# Patient Record
Sex: FEMALE | NOT HISPANIC OR LATINO | Employment: PART TIME | URBAN - METROPOLITAN AREA
[De-identification: names, ages, dates, MRNs, and addresses within clinical notes are randomized per-mention and may not be internally consistent; named-entity substitution may affect disease eponyms.]

---

## 2023-11-13 DIAGNOSIS — E11.9 TYPE 2 DIABETES MELLITUS WITHOUT COMPLICATION, WITHOUT LONG-TERM CURRENT USE OF INSULIN (MULTI): Primary | ICD-10-CM

## 2023-11-13 RX ORDER — METFORMIN HYDROCHLORIDE 500 MG/1
1000 TABLET, EXTENDED RELEASE ORAL 2 TIMES DAILY
Qty: 120 TABLET | Refills: 0 | Status: SHIPPED | OUTPATIENT
Start: 2023-11-13

## 2024-01-18 DIAGNOSIS — N18.31 CHRONIC KIDNEY DISEASE, STAGE 3A (MULTI): Primary | ICD-10-CM

## 2024-01-19 ENCOUNTER — LAB (OUTPATIENT)
Dept: LAB | Facility: LAB | Age: 69
End: 2024-01-19
Payer: COMMERCIAL

## 2024-01-19 DIAGNOSIS — N18.31 CHRONIC KIDNEY DISEASE, STAGE 3A (MULTI): ICD-10-CM

## 2024-01-19 LAB
ALBUMIN SERPL-MCNC: 4.3 G/DL (ref 3.5–5)
ANION GAP SERPL CALC-SCNC: 9 MMOL/L
BUN SERPL-MCNC: 21 MG/DL (ref 8–25)
CALCIUM SERPL-MCNC: 9.9 MG/DL (ref 8.5–10.4)
CHLORIDE SERPL-SCNC: 104 MMOL/L (ref 97–107)
CO2 SERPL-SCNC: 30 MMOL/L (ref 24–31)
CREAT SERPL-MCNC: 1.2 MG/DL (ref 0.4–1.6)
EGFRCR SERPLBLD CKD-EPI 2021: 50 ML/MIN/1.73M*2
ERYTHROCYTE [DISTWIDTH] IN BLOOD BY AUTOMATED COUNT: 15.4 % (ref 11.5–14.5)
GLUCOSE SERPL-MCNC: 129 MG/DL (ref 65–99)
HCT VFR BLD AUTO: 35 % (ref 36–46)
HGB BLD-MCNC: 11.2 G/DL (ref 12–16)
MCH RBC QN AUTO: 28.4 PG (ref 26–34)
MCHC RBC AUTO-ENTMCNC: 32 G/DL (ref 32–36)
MCV RBC AUTO: 89 FL (ref 80–100)
NRBC BLD-RTO: 0 /100 WBCS (ref 0–0)
PHOSPHATE SERPL-MCNC: 4.1 MG/DL (ref 2.5–4.5)
PLATELET # BLD AUTO: 253 X10*3/UL (ref 150–450)
POTASSIUM SERPL-SCNC: 4.6 MMOL/L (ref 3.4–5.1)
RBC # BLD AUTO: 3.95 X10*6/UL (ref 4–5.2)
SODIUM SERPL-SCNC: 143 MMOL/L (ref 133–145)
WBC # BLD AUTO: 5.9 X10*3/UL (ref 4.4–11.3)

## 2024-01-19 PROCEDURE — 85027 COMPLETE CBC AUTOMATED: CPT

## 2024-01-19 PROCEDURE — 80069 RENAL FUNCTION PANEL: CPT

## 2024-01-19 PROCEDURE — 36415 COLL VENOUS BLD VENIPUNCTURE: CPT

## 2024-01-25 DIAGNOSIS — N18.31 CHRONIC KIDNEY DISEASE, STAGE 3A (MULTI): Primary | ICD-10-CM

## 2024-03-22 ENCOUNTER — HOSPITAL ENCOUNTER (OUTPATIENT)
Dept: RADIOLOGY | Facility: HOSPITAL | Age: 69
Discharge: HOME | End: 2024-03-22
Payer: COMMERCIAL

## 2024-03-22 VITALS — HEIGHT: 65 IN | BODY MASS INDEX: 36.65 KG/M2 | WEIGHT: 220 LBS

## 2024-03-22 DIAGNOSIS — Z12.39 ENCOUNTER FOR OTHER SCREENING FOR MALIGNANT NEOPLASM OF BREAST: ICD-10-CM

## 2024-03-22 DIAGNOSIS — Z13.6 ENCOUNTER FOR SCREENING FOR CARDIOVASCULAR DISORDERS: ICD-10-CM

## 2024-03-22 PROCEDURE — 77067 SCR MAMMO BI INCL CAD: CPT

## 2024-03-22 PROCEDURE — 76706 US ABDL AORTA SCREEN AAA: CPT

## 2024-03-22 PROCEDURE — 77063 BREAST TOMOSYNTHESIS BI: CPT | Performed by: RADIOLOGY

## 2024-03-22 PROCEDURE — 77067 SCR MAMMO BI INCL CAD: CPT | Performed by: RADIOLOGY

## 2024-03-22 PROCEDURE — 76706 US ABDL AORTA SCREEN AAA: CPT | Performed by: RADIOLOGY

## 2024-04-26 ENCOUNTER — HOSPITAL ENCOUNTER (OUTPATIENT)
Dept: RADIOLOGY | Facility: HOSPITAL | Age: 69
Discharge: HOME | End: 2024-04-26
Payer: COMMERCIAL

## 2024-04-26 DIAGNOSIS — Z78.0 ASYMPTOMATIC MENOPAUSAL STATE: ICD-10-CM

## 2024-04-26 DIAGNOSIS — Z13.6 ENCOUNTER FOR SCREENING FOR CARDIOVASCULAR DISORDERS: ICD-10-CM

## 2024-04-26 PROCEDURE — 77080 DXA BONE DENSITY AXIAL: CPT | Performed by: RADIOLOGY

## 2024-04-26 PROCEDURE — 75571 CT HRT W/O DYE W/CA TEST: CPT

## 2024-04-26 PROCEDURE — 77080 DXA BONE DENSITY AXIAL: CPT

## 2024-05-15 ENCOUNTER — LAB (OUTPATIENT)
Dept: LAB | Facility: LAB | Age: 69
End: 2024-05-15
Payer: COMMERCIAL

## 2024-05-15 DIAGNOSIS — N18.31 CHRONIC KIDNEY DISEASE, STAGE 3A (MULTI): ICD-10-CM

## 2024-05-15 LAB
ALBUMIN SERPL-MCNC: 4.2 G/DL (ref 3.5–5)
ANION GAP SERPL CALC-SCNC: 14 MMOL/L
BUN SERPL-MCNC: 26 MG/DL (ref 8–25)
CALCIUM SERPL-MCNC: 9.9 MG/DL (ref 8.5–10.4)
CHLORIDE SERPL-SCNC: 102 MMOL/L (ref 97–107)
CO2 SERPL-SCNC: 25 MMOL/L (ref 24–31)
CREAT SERPL-MCNC: 1.2 MG/DL (ref 0.4–1.6)
EGFRCR SERPLBLD CKD-EPI 2021: 50 ML/MIN/1.73M*2
ERYTHROCYTE [DISTWIDTH] IN BLOOD BY AUTOMATED COUNT: 16.3 % (ref 11.5–14.5)
GLUCOSE SERPL-MCNC: 69 MG/DL (ref 65–99)
HCT VFR BLD AUTO: 34 % (ref 36–46)
HGB BLD-MCNC: 10.8 G/DL (ref 12–16)
MCH RBC QN AUTO: 27.8 PG (ref 26–34)
MCHC RBC AUTO-ENTMCNC: 31.8 G/DL (ref 32–36)
MCV RBC AUTO: 88 FL (ref 80–100)
NRBC BLD-RTO: 0 /100 WBCS (ref 0–0)
PHOSPHATE SERPL-MCNC: 3.6 MG/DL (ref 2.5–4.5)
PLATELET # BLD AUTO: 257 X10*3/UL (ref 150–450)
POTASSIUM SERPL-SCNC: 4.9 MMOL/L (ref 3.4–5.1)
RBC # BLD AUTO: 3.88 X10*6/UL (ref 4–5.2)
SODIUM SERPL-SCNC: 141 MMOL/L (ref 133–145)
WBC # BLD AUTO: 6.2 X10*3/UL (ref 4.4–11.3)

## 2024-05-15 PROCEDURE — 36415 COLL VENOUS BLD VENIPUNCTURE: CPT

## 2024-05-15 PROCEDURE — 80069 RENAL FUNCTION PANEL: CPT

## 2024-05-15 PROCEDURE — 85027 COMPLETE CBC AUTOMATED: CPT

## 2024-09-27 DIAGNOSIS — I82.562 CHRONIC DEEP VEIN THROMBOSIS (DVT) OF CALF MUSCLE VEIN OF LEFT LOWER EXTREMITY (MULTI): Primary | ICD-10-CM

## 2024-10-07 ENCOUNTER — HOSPITAL ENCOUNTER (OUTPATIENT)
Dept: RADIOLOGY | Facility: CLINIC | Age: 69
Discharge: HOME | End: 2024-10-07
Payer: COMMERCIAL

## 2024-10-07 ENCOUNTER — HOSPITAL ENCOUNTER (OUTPATIENT)
Dept: RADIOLOGY | Facility: CLINIC | Age: 69
End: 2024-10-07
Payer: COMMERCIAL

## 2024-10-07 DIAGNOSIS — I82.562 CHRONIC DEEP VEIN THROMBOSIS (DVT) OF CALF MUSCLE VEIN OF LEFT LOWER EXTREMITY (MULTI): ICD-10-CM

## 2024-10-07 PROCEDURE — 93971 EXTREMITY STUDY: CPT | Performed by: RADIOLOGY

## 2024-10-07 PROCEDURE — 93971 EXTREMITY STUDY: CPT

## 2025-02-19 ENCOUNTER — APPOINTMENT (OUTPATIENT)
Dept: PRIMARY CARE | Facility: CLINIC | Age: 70
End: 2025-02-19
Payer: COMMERCIAL

## 2025-02-19 ENCOUNTER — TELEPHONE (OUTPATIENT)
Dept: PRIMARY CARE | Facility: CLINIC | Age: 70
End: 2025-02-19

## 2025-02-19 VITALS
WEIGHT: 226 LBS | HEART RATE: 70 BPM | HEIGHT: 66 IN | RESPIRATION RATE: 18 BRPM | SYSTOLIC BLOOD PRESSURE: 102 MMHG | DIASTOLIC BLOOD PRESSURE: 56 MMHG | BODY MASS INDEX: 36.32 KG/M2 | OXYGEN SATURATION: 99 %

## 2025-02-19 DIAGNOSIS — E11.22 TYPE 2 DIABETES MELLITUS WITH STAGE 3A CHRONIC KIDNEY DISEASE, WITHOUT LONG-TERM CURRENT USE OF INSULIN (MULTI): ICD-10-CM

## 2025-02-19 DIAGNOSIS — Z79.899 MEDICATION MANAGEMENT: ICD-10-CM

## 2025-02-19 DIAGNOSIS — N18.31 TYPE 2 DIABETES MELLITUS WITH STAGE 3A CHRONIC KIDNEY DISEASE, WITHOUT LONG-TERM CURRENT USE OF INSULIN (MULTI): Primary | ICD-10-CM

## 2025-02-19 DIAGNOSIS — E03.9 HYPOTHYROIDISM, UNSPECIFIED TYPE: ICD-10-CM

## 2025-02-19 DIAGNOSIS — J01.40 ACUTE PANSINUSITIS, RECURRENCE NOT SPECIFIED: ICD-10-CM

## 2025-02-19 DIAGNOSIS — I10 PRIMARY HYPERTENSION: ICD-10-CM

## 2025-02-19 DIAGNOSIS — E11.22 TYPE 2 DIABETES MELLITUS WITH STAGE 3A CHRONIC KIDNEY DISEASE, WITHOUT LONG-TERM CURRENT USE OF INSULIN (MULTI): Primary | ICD-10-CM

## 2025-02-19 DIAGNOSIS — N18.31 TYPE 2 DIABETES MELLITUS WITH STAGE 3A CHRONIC KIDNEY DISEASE, WITHOUT LONG-TERM CURRENT USE OF INSULIN (MULTI): ICD-10-CM

## 2025-02-19 DIAGNOSIS — K58.0 IRRITABLE BOWEL SYNDROME WITH DIARRHEA: ICD-10-CM

## 2025-02-19 LAB — POC HEMOGLOBIN A1C: 7 % (ref 4.2–6.5)

## 2025-02-19 PROCEDURE — 3008F BODY MASS INDEX DOCD: CPT

## 2025-02-19 PROCEDURE — G2211 COMPLEX E/M VISIT ADD ON: HCPCS

## 2025-02-19 PROCEDURE — 1159F MED LIST DOCD IN RCRD: CPT

## 2025-02-19 PROCEDURE — 3074F SYST BP LT 130 MM HG: CPT

## 2025-02-19 PROCEDURE — 83036 HEMOGLOBIN GLYCOSYLATED A1C: CPT

## 2025-02-19 PROCEDURE — 1126F AMNT PAIN NOTED NONE PRSNT: CPT

## 2025-02-19 PROCEDURE — 4010F ACE/ARB THERAPY RXD/TAKEN: CPT

## 2025-02-19 PROCEDURE — 1160F RVW MEDS BY RX/DR IN RCRD: CPT

## 2025-02-19 PROCEDURE — 99204 OFFICE O/P NEW MOD 45 MIN: CPT

## 2025-02-19 PROCEDURE — 3078F DIAST BP <80 MM HG: CPT

## 2025-02-19 RX ORDER — FLUTICASONE PROPIONATE 50 MCG
1 SPRAY, SUSPENSION (ML) NASAL DAILY
Qty: 16 G | Refills: 0 | Status: SHIPPED | OUTPATIENT
Start: 2025-02-19

## 2025-02-19 RX ORDER — FERROUS SULFATE 325(65) MG
1 TABLET ORAL 2 TIMES DAILY
COMMUNITY
Start: 2021-08-25 | End: 2025-06-21

## 2025-02-19 RX ORDER — INSULIN GLARGINE 100 [IU]/ML
10 INJECTION, SOLUTION SUBCUTANEOUS NIGHTLY
Start: 2025-02-19

## 2025-02-19 RX ORDER — PEN NEEDLE, DIABETIC 31 GX5/16"
NEEDLE, DISPOSABLE MISCELLANEOUS
COMMUNITY
Start: 2024-07-19

## 2025-02-19 RX ORDER — LEVOTHYROXINE SODIUM 75 UG/1
75 TABLET ORAL
COMMUNITY
Start: 2023-03-31 | End: 2026-02-14

## 2025-02-19 RX ORDER — FUROSEMIDE 40 MG/1
20 TABLET ORAL EVERY OTHER DAY
COMMUNITY
Start: 2023-06-26

## 2025-02-19 RX ORDER — CHOLECALCIFEROL (VITAMIN D3) 25 MCG
1 TABLET ORAL DAILY
COMMUNITY
Start: 2017-01-23

## 2025-02-19 RX ORDER — INSULIN GLARGINE 100 [IU]/ML
INJECTION, SOLUTION SUBCUTANEOUS
COMMUNITY
Start: 2022-04-18 | End: 2025-02-19 | Stop reason: WASHOUT

## 2025-02-19 RX ORDER — PETROLATUM,WHITE 41 %
OINTMENT (GRAM) TOPICAL
COMMUNITY
Start: 2023-07-21

## 2025-02-19 RX ORDER — CLOBETASOL PROPIONATE 0.5 MG/G
CREAM TOPICAL 2 TIMES DAILY
COMMUNITY
Start: 2023-07-21

## 2025-02-19 RX ORDER — OXYBUTYNIN CHLORIDE 10 MG/1
10 TABLET, EXTENDED RELEASE ORAL DAILY
COMMUNITY

## 2025-02-19 RX ORDER — ALBUTEROL SULFATE 90 UG/1
INHALANT RESPIRATORY (INHALATION)
COMMUNITY
Start: 2017-06-05

## 2025-02-19 RX ORDER — MONTELUKAST SODIUM 10 MG/1
1 TABLET ORAL DAILY
COMMUNITY
Start: 2017-10-31

## 2025-02-19 RX ORDER — FAMOTIDINE 40 MG/1
1 TABLET, FILM COATED ORAL NIGHTLY
COMMUNITY
Start: 2023-10-13 | End: 2026-02-14

## 2025-02-19 RX ORDER — LISINOPRIL 2.5 MG/1
2.5 TABLET ORAL
COMMUNITY
Start: 2024-12-06

## 2025-02-19 RX ORDER — CICLOPIROX 80 MG/ML
SOLUTION TOPICAL
COMMUNITY
Start: 2024-07-19

## 2025-02-19 RX ORDER — AMOXICILLIN AND CLAVULANATE POTASSIUM 875; 125 MG/1; MG/1
875 TABLET, FILM COATED ORAL 2 TIMES DAILY
Qty: 20 TABLET | Refills: 0 | Status: SHIPPED | OUTPATIENT
Start: 2025-02-19 | End: 2025-03-01

## 2025-02-19 ASSESSMENT — PATIENT HEALTH QUESTIONNAIRE - PHQ9
SUM OF ALL RESPONSES TO PHQ9 QUESTIONS 1 AND 2: 6
7. TROUBLE CONCENTRATING ON THINGS, SUCH AS READING THE NEWSPAPER OR WATCHING TELEVISION: MORE THAN HALF THE DAYS
6. FEELING BAD ABOUT YOURSELF - OR THAT YOU ARE A FAILURE OR HAVE LET YOURSELF OR YOUR FAMILY DOWN: NEARLY EVERY DAY
3. TROUBLE FALLING OR STAYING ASLEEP OR SLEEPING TOO MUCH: MORE THAN HALF THE DAYS
2. FEELING DOWN, DEPRESSED OR HOPELESS: NEARLY EVERY DAY
4. FEELING TIRED OR HAVING LITTLE ENERGY: NOT AT ALL
5. POOR APPETITE OR OVEREATING: SEVERAL DAYS
8. MOVING OR SPEAKING SO SLOWLY THAT OTHER PEOPLE COULD HAVE NOTICED. OR THE OPPOSITE, BEING SO FIGETY OR RESTLESS THAT YOU HAVE BEEN MOVING AROUND A LOT MORE THAN USUAL: NOT AT ALL
10. IF YOU CHECKED OFF ANY PROBLEMS, HOW DIFFICULT HAVE THESE PROBLEMS MADE IT FOR YOU TO DO YOUR WORK, TAKE CARE OF THINGS AT HOME, OR GET ALONG WITH OTHER PEOPLE: NOT DIFFICULT AT ALL
9. THOUGHTS THAT YOU WOULD BE BETTER OFF DEAD, OR OF HURTING YOURSELF: NOT AT ALL
1. LITTLE INTEREST OR PLEASURE IN DOING THINGS: NEARLY EVERY DAY
SUM OF ALL RESPONSES TO PHQ QUESTIONS 1-9: 14

## 2025-02-19 ASSESSMENT — ENCOUNTER SYMPTOMS
CHILLS: 1
RHINORRHEA: 0
FEVER: 0
SORE THROAT: 0
SHORTNESS OF BREATH: 0
APPETITE CHANGE: 1
COUGH: 1
SINUS PRESSURE: 1
HEADACHES: 1

## 2025-02-19 ASSESSMENT — PAIN SCALES - GENERAL: PAINLEVEL_OUTOF10: 0-NO PAIN

## 2025-02-19 NOTE — PROGRESS NOTES
"Subjective   Patient ID: Kayli Senior is a 69 y.o. female who presents for Diabetes (New patient is here to discuss diabetes, IBS and bronchitis ).    HPI   Kayli is a new patient here to establish care. PMH, FH, SH, medications reviewed and updated with patient.     DM: On Januvia 100mg, Jardiance 25mg, Metformin XR 1,000mg BID, Lantus 10U nightly. Last A1C 7.0. Does not check sugars at home. Last microalbumin - <0.05 and GFR - 47 (10/24). On ACEI. Last eye exam - sees Dr. Stafford and Paola. Last foot exam - sees podiatry monthly (Dr. Madison). Denies hypoglycemic incidents, chest pain, shortness of breath, headache.     Hypothyroidism: On Levothyroxine 75 mcg daily. Most recent TSH 03/22.     HTN: On Lisinopril 2.5 mg, Lasix 40mg every other day. No medication side effects. Does not check home BP. Gets dizziness when standing. Denies chest pain, heart palpitations, SOB, headaches, changes of vision, syncope, leg swelling.     Chronic IBS and fecal incontinence     Review of Systems   Constitutional:  Positive for appetite change and chills. Negative for fever.   HENT:  Positive for congestion and sinus pressure. Negative for postnasal drip, rhinorrhea and sore throat.    Respiratory:  Positive for cough. Negative for shortness of breath.    Cardiovascular:  Negative for chest pain.   Neurological:  Positive for headaches.     Objective   /56 (BP Location: Left arm, Patient Position: Sitting, BP Cuff Size: Large adult)   Pulse 70   Resp 18   Ht 1.664 m (5' 5.5\")   Wt 103 kg (226 lb)   LMP  (LMP Unknown)   SpO2 99%   BMI 37.04 kg/m²     Physical Exam  Vitals and nursing note reviewed.   Constitutional:       General: She is not in acute distress.  HENT:      Right Ear: Tympanic membrane and ear canal normal.      Left Ear: Tympanic membrane and ear canal normal.      Nose: Congestion present.      Mouth/Throat:      Mouth: Mucous membranes are moist.      Pharynx: No oropharyngeal exudate.   Eyes: "      Extraocular Movements: Extraocular movements intact.      Conjunctiva/sclera: Conjunctivae normal.   Neck:      Vascular: No carotid bruit.   Cardiovascular:      Rate and Rhythm: Normal rate and regular rhythm.   Pulmonary:      Effort: Pulmonary effort is normal.      Breath sounds: Normal breath sounds.   Musculoskeletal:      Cervical back: Neck supple.      Right lower leg: No edema.      Left lower leg: No edema.   Lymphadenopathy:      Cervical: No cervical adenopathy.   Neurological:      General: No focal deficit present.      Mental Status: She is alert.   Psychiatric:         Mood and Affect: Mood normal.         Assessment/Plan   Assessment & Plan  Type 2 diabetes mellitus with stage 3a chronic kidney disease, without long-term current use of insulin (Multi)  Chronic. Continue Januvia 100mg, Jardiance 25mg, Metformin XR 1,000mg BID, Lantus 10U nightly. Low carbohydrate diet and increase in activity. Labs ordered, will follow up with results. Recheck in 2 weeks with Bina Lafleur Rph. Consider CGM at this time.     Orders:    POCT glycosylated hemoglobin (Hb A1C) manually resulted    Collection capillary blood specimen; Future    Comprehensive metabolic panel; Future    CBC and Auto Differential; Future    Albumin-Creatinine Ratio, Urine Random; Future    Lipid Panel; Future    insulin glargine (Lantus U-100 Insulin) 100 unit/mL injection; Inject 10 Units under the skin once daily at bedtime.    Primary hypertension  Chronic. Continue Lisinopril 2.5 mg. Decrease Lasix to 20mg every other day. DASH diet and 30 minutes of exercise 5x/week. Check home BP and keep log. Labs ordered, will follow up with results. Recheck in 1 month at CPE.     Orders:    Comprehensive metabolic panel; Future    CBC and Auto Differential; Future    Albumin-Creatinine Ratio, Urine Random; Future    Hypothyroidism, unspecified type  Chronic. Continue Levothyroxine 75mcg. Labs ordered, will follow up with results.    Orders:     Tsh With Reflex To Free T4 If Abnormal; Future    Irritable bowel syndrome with diarrhea  Chronic, needs better control.   FODMAP diet, food journal.   Referral to GI for further evaluation and management.     Orders:    Referral to Gastroenterology; Future    Acute pansinusitis, recurrence not specified  Acute.   Augmentin as directed. Risks and benefits of medication discussed and prescribed.   OTC Tylenol as directed for sinus pain. Flonase as directed for sinus congestion and ear pressure. OTC antihistamine as directed for sinus drainage. Increase fluids, rest, humidifier.   Follow up if symptoms do not improve within 7-10 days, or sooner for worsening.     Orders:    amoxicillin-pot clavulanate (Augmentin) 875-125 mg tablet; Take 1 tablet (875 mg) by mouth 2 times a day for 10 days. Take with food.    fluticasone (Flonase) 50 mcg/actuation nasal spray; Administer 1 spray into each nostril once daily. Shake gently. Before first use, prime pump. After use, clean tip and replace cap.

## 2025-02-27 LAB
ALBUMIN SERPL-MCNC: 4 G/DL (ref 3.6–5.1)
ALBUMIN/CREAT UR: NORMAL MG/G CREAT
ALP SERPL-CCNC: 93 U/L (ref 37–153)
ALT SERPL-CCNC: 8 U/L (ref 6–29)
ANION GAP SERPL CALCULATED.4IONS-SCNC: 8 MMOL/L (CALC) (ref 7–17)
AST SERPL-CCNC: 11 U/L (ref 10–35)
BASOPHILS # BLD AUTO: 31 CELLS/UL (ref 0–200)
BASOPHILS NFR BLD AUTO: 0.5 %
BILIRUB SERPL-MCNC: 0.5 MG/DL (ref 0.2–1.2)
BUN SERPL-MCNC: 22 MG/DL (ref 7–25)
CALCIUM SERPL-MCNC: 9.3 MG/DL (ref 8.6–10.4)
CHLORIDE SERPL-SCNC: 104 MMOL/L (ref 98–110)
CHOLEST SERPL-MCNC: 160 MG/DL
CHOLEST/HDLC SERPL: 2.9 (CALC)
CO2 SERPL-SCNC: 30 MMOL/L (ref 20–32)
CREAT SERPL-MCNC: 1.17 MG/DL (ref 0.5–1.05)
CREAT UR-MCNC: 60 MG/DL (ref 20–275)
EGFRCR SERPLBLD CKD-EPI 2021: 51 ML/MIN/1.73M2
EOSINOPHIL # BLD AUTO: 372 CELLS/UL (ref 15–500)
EOSINOPHIL NFR BLD AUTO: 6.1 %
ERYTHROCYTE [DISTWIDTH] IN BLOOD BY AUTOMATED COUNT: 15.3 % (ref 11–15)
GLUCOSE SERPL-MCNC: 95 MG/DL (ref 65–99)
HCT VFR BLD AUTO: 33 % (ref 35–45)
HDLC SERPL-MCNC: 56 MG/DL
HGB BLD-MCNC: 10.6 G/DL (ref 11.7–15.5)
LDLC SERPL CALC-MCNC: 90 MG/DL (CALC)
LYMPHOCYTES # BLD AUTO: 1092 CELLS/UL (ref 850–3900)
LYMPHOCYTES NFR BLD AUTO: 17.9 %
MCH RBC QN AUTO: 28.9 PG (ref 27–33)
MCHC RBC AUTO-ENTMCNC: 32.1 G/DL (ref 32–36)
MCV RBC AUTO: 89.9 FL (ref 80–100)
MICROALBUMIN UR-MCNC: <0.2 MG/DL
MONOCYTES # BLD AUTO: 561 CELLS/UL (ref 200–950)
MONOCYTES NFR BLD AUTO: 9.2 %
NEUTROPHILS # BLD AUTO: 4044 CELLS/UL (ref 1500–7800)
NEUTROPHILS NFR BLD AUTO: 66.3 %
NONHDLC SERPL-MCNC: 104 MG/DL (CALC)
PLATELET # BLD AUTO: 307 THOUSAND/UL (ref 140–400)
PMV BLD REES-ECKER: 10.3 FL (ref 7.5–12.5)
POTASSIUM SERPL-SCNC: 4.4 MMOL/L (ref 3.5–5.3)
PROT SERPL-MCNC: 6.5 G/DL (ref 6.1–8.1)
RBC # BLD AUTO: 3.67 MILLION/UL (ref 3.8–5.1)
SODIUM SERPL-SCNC: 142 MMOL/L (ref 135–146)
TRIGL SERPL-MCNC: 54 MG/DL
TSH SERPL-ACNC: 0.73 MIU/L (ref 0.4–4.5)
WBC # BLD AUTO: 6.1 THOUSAND/UL (ref 3.8–10.8)

## 2025-03-12 ENCOUNTER — TELEPHONE (OUTPATIENT)
Dept: PRIMARY CARE | Facility: CLINIC | Age: 70
End: 2025-03-12

## 2025-03-12 ENCOUNTER — APPOINTMENT (OUTPATIENT)
Dept: PRIMARY CARE | Facility: CLINIC | Age: 70
End: 2025-03-12
Payer: COMMERCIAL

## 2025-03-12 VITALS
SYSTOLIC BLOOD PRESSURE: 110 MMHG | HEART RATE: 59 BPM | TEMPERATURE: 97.2 F | BODY MASS INDEX: 39.36 KG/M2 | DIASTOLIC BLOOD PRESSURE: 60 MMHG | WEIGHT: 240.2 LBS | OXYGEN SATURATION: 98 %

## 2025-03-12 DIAGNOSIS — J45.20 MILD INTERMITTENT ASTHMA WITHOUT COMPLICATION (HHS-HCC): ICD-10-CM

## 2025-03-12 DIAGNOSIS — E03.9 HYPOTHYROIDISM, UNSPECIFIED TYPE: ICD-10-CM

## 2025-03-12 DIAGNOSIS — E11.22 TYPE 2 DIABETES MELLITUS WITH STAGE 3A CHRONIC KIDNEY DISEASE, WITHOUT LONG-TERM CURRENT USE OF INSULIN (MULTI): ICD-10-CM

## 2025-03-12 DIAGNOSIS — Z00.00 ROUTINE GENERAL MEDICAL EXAMINATION AT A HEALTH CARE FACILITY: Primary | ICD-10-CM

## 2025-03-12 DIAGNOSIS — Z12.31 ENCOUNTER FOR SCREENING MAMMOGRAM FOR BREAST CANCER: ICD-10-CM

## 2025-03-12 DIAGNOSIS — E11.22 TYPE 2 DIABETES MELLITUS WITH STAGE 3A CHRONIC KIDNEY DISEASE, WITHOUT LONG-TERM CURRENT USE OF INSULIN (MULTI): Primary | ICD-10-CM

## 2025-03-12 DIAGNOSIS — K21.9 GASTROESOPHAGEAL REFLUX DISEASE, UNSPECIFIED WHETHER ESOPHAGITIS PRESENT: ICD-10-CM

## 2025-03-12 DIAGNOSIS — D50.9 IRON DEFICIENCY ANEMIA, UNSPECIFIED IRON DEFICIENCY ANEMIA TYPE: ICD-10-CM

## 2025-03-12 DIAGNOSIS — M25.542 JOINT PAIN IN BOTH HANDS: ICD-10-CM

## 2025-03-12 DIAGNOSIS — R06.09 DOE (DYSPNEA ON EXERTION): ICD-10-CM

## 2025-03-12 DIAGNOSIS — N18.31 TYPE 2 DIABETES MELLITUS WITH STAGE 3A CHRONIC KIDNEY DISEASE, WITHOUT LONG-TERM CURRENT USE OF INSULIN (MULTI): ICD-10-CM

## 2025-03-12 DIAGNOSIS — I10 PRIMARY HYPERTENSION: ICD-10-CM

## 2025-03-12 DIAGNOSIS — M25.541 JOINT PAIN IN BOTH HANDS: ICD-10-CM

## 2025-03-12 DIAGNOSIS — N18.31 TYPE 2 DIABETES MELLITUS WITH STAGE 3A CHRONIC KIDNEY DISEASE, WITHOUT LONG-TERM CURRENT USE OF INSULIN (MULTI): Primary | ICD-10-CM

## 2025-03-12 PROCEDURE — 4010F ACE/ARB THERAPY RXD/TAKEN: CPT

## 2025-03-12 PROCEDURE — 3074F SYST BP LT 130 MM HG: CPT

## 2025-03-12 PROCEDURE — 1126F AMNT PAIN NOTED NONE PRSNT: CPT

## 2025-03-12 PROCEDURE — 99214 OFFICE O/P EST MOD 30 MIN: CPT

## 2025-03-12 PROCEDURE — 1170F FXNL STATUS ASSESSED: CPT

## 2025-03-12 PROCEDURE — 3078F DIAST BP <80 MM HG: CPT

## 2025-03-12 PROCEDURE — G0439 PPPS, SUBSEQ VISIT: HCPCS

## 2025-03-12 PROCEDURE — 1159F MED LIST DOCD IN RCRD: CPT

## 2025-03-12 PROCEDURE — 1160F RVW MEDS BY RX/DR IN RCRD: CPT

## 2025-03-12 PROCEDURE — 99397 PER PM REEVAL EST PAT 65+ YR: CPT

## 2025-03-12 PROCEDURE — 1036F TOBACCO NON-USER: CPT

## 2025-03-12 RX ORDER — GLUCAGON INJECTION, SOLUTION 1 MG/.2ML
INJECTION, SOLUTION SUBCUTANEOUS
Qty: 0.4 ML | Refills: 11 | Status: SHIPPED | OUTPATIENT
Start: 2025-03-12

## 2025-03-12 RX ORDER — OMEPRAZOLE 20 MG/1
20 CAPSULE, DELAYED RELEASE ORAL DAILY
Qty: 90 CAPSULE | Refills: 0 | Status: SHIPPED | OUTPATIENT
Start: 2025-03-12 | End: 2025-06-10

## 2025-03-12 RX ORDER — TIRZEPATIDE 2.5 MG/.5ML
2.5 INJECTION, SOLUTION SUBCUTANEOUS
Qty: 2 ML | Refills: 0 | Status: SHIPPED | OUTPATIENT
Start: 2025-03-12 | End: 2025-04-09

## 2025-03-12 RX ORDER — ALBUTEROL SULFATE 90 UG/1
2 INHALANT RESPIRATORY (INHALATION) EVERY 4 HOURS PRN
Qty: 18 G | Refills: 0 | Status: SHIPPED | OUTPATIENT
Start: 2025-03-12

## 2025-03-12 ASSESSMENT — ENCOUNTER SYMPTOMS
NAUSEA: 0
SORE THROAT: 0
ABDOMINAL PAIN: 0
OCCASIONAL FEELINGS OF UNSTEADINESS: 0
HEADACHES: 0
RHINORRHEA: 0
WEAKNESS: 0
FEVER: 0
VOMITING: 0
UNEXPECTED WEIGHT CHANGE: 0
DYSPHORIC MOOD: 0
NERVOUS/ANXIOUS: 0
COUGH: 0
ARTHRALGIAS: 0
LOSS OF SENSATION IN FEET: 0
MYALGIAS: 0
CHILLS: 0
BLOOD IN STOOL: 0
TROUBLE SWALLOWING: 0
NUMBNESS: 0
DEPRESSION: 0
DYSURIA: 0
SHORTNESS OF BREATH: 0
HEMATURIA: 0

## 2025-03-12 ASSESSMENT — PAIN SCALES - GENERAL: PAINLEVEL_OUTOF10: 0-NO PAIN

## 2025-03-12 ASSESSMENT — PATIENT HEALTH QUESTIONNAIRE - PHQ9
SUM OF ALL RESPONSES TO PHQ9 QUESTIONS 1 AND 2: 2
2. FEELING DOWN, DEPRESSED OR HOPELESS: SEVERAL DAYS
10. IF YOU CHECKED OFF ANY PROBLEMS, HOW DIFFICULT HAVE THESE PROBLEMS MADE IT FOR YOU TO DO YOUR WORK, TAKE CARE OF THINGS AT HOME, OR GET ALONG WITH OTHER PEOPLE: NOT DIFFICULT AT ALL
1. LITTLE INTEREST OR PLEASURE IN DOING THINGS: SEVERAL DAYS

## 2025-03-12 ASSESSMENT — ACTIVITIES OF DAILY LIVING (ADL)
TAKING_MEDICATION: INDEPENDENT
GROCERY_SHOPPING: INDEPENDENT
BATHING: INDEPENDENT
DRESSING: INDEPENDENT
MANAGING_FINANCES: INDEPENDENT
DOING_HOUSEWORK: INDEPENDENT

## 2025-03-12 NOTE — PROGRESS NOTES
Subjective   Reason for Visit: Kayli Senior is an 69 y.o. female here for a Medicare Wellness visit.     Past Medical, Surgical, and Family History reviewed and updated in chart.    Reviewed all medications by prescribing practitioner or clinical pharmacist (such as prescriptions, OTCs, herbal therapies and supplements) and documented in the medical record.    HPI    Patient Care Team:  Gina Archuleta PA-C as PCP - General (Family Medicine)     Kayli Senior is seen for her comprehensive physical exam. PMH, SH, FH, medications were reviewed and updated.     CHRONIC ISSUES:   DM: On Januvia 100mg, Jardiance 25mg, Metformin XR 1,000mg BID, Lantus 10U nightly. Last A1C 7.0. Does not check sugars at home. Last microalbumin  WNL and GFR - 51. On ACEI. Last eye exam - sees Dr. Stafford and Paola. Last foot exam - sees podiatry monthly (Dr. Madison). Has appointment with nephrologist in 06/24. Denies hypoglycemic incidents, chest pain, shortness of breath, headache.     Hypothyroidism: On Levothyroxine 75 mcg daily. Most recent TSH WNL.      HTN: On Lisinopril 2.5 mg, Lasix 20mg every other day. Lightheadedness has improved. No medication side effects. Does not check home BP. Reports BRICEÑO, swelling of legs. Denies chest pain, heart palpitations, SOB, headaches, changes of vision, syncope.    VALORIE: Recent hgb 10.6. Takes OTC iron once daily.     IMMUNIZATIONS:   Immunization History   Administered Date(s) Administered    Flu vaccine (IIV4), preservative free *Check age/dose* 11/14/2017    Flu vaccine, trivalent, preservative free, age 6 months and greater (Fluarix/Fluzone/Flulaval) 09/18/2015    Hepatitis B vaccine, 19 yrs and under (RECOMBIVAX, ENGERIX) 04/18/2014, 05/19/2014    Hepatitis B vaccine, adult *Check Product/Dose* 11/17/2014    Influenza, Unspecified 01/02/2008, 10/20/2008, 11/05/2013    Influenza, seasonal, injectable 12/06/2012, 10/01/2013, 09/17/2014    Pneumococcal polysaccharide vaccine, 23-valent,  age 2 years and older (PNEUMOVAX 23) 10/01/2006    Tdap vaccine, age 7 year and older (BOOSTRIX, ADACEL) 04/03/2009, 05/05/2010, 04/05/2017   Prevnar: Declines   Shingles: Declines      LUNG CANCER SCREENING (50-77 y.o. with 20+ pack year hx & current smoker or quit <15yrs ago)  Social History     Tobacco Use   Smoking Status Never    Passive exposure: Never   Smokeless Tobacco Never     COLORECTAL SCREENING (From 47 or 10yrs younger than family diagnosis)  Last colonoscopy - 03/2022  Next colonoscopy due - 2032  Relevant FHx - None    GYN  LMP - Denies vaginal bleeding   Next Pap due - No longer indicated     MAMMOGRAM SCREENING (From age 42)   Last mammogram - 3/24  Next mammogram due - Due     DEXA - 2024, Osteopenia     Saw eye doctor last month, sees every 3-6 months    Review of Systems   Constitutional:  Negative for chills, fever and unexpected weight change.   HENT:  Negative for congestion, ear pain, hearing loss, rhinorrhea, sore throat and trouble swallowing.    Eyes:  Negative for visual disturbance.   Respiratory:  Negative for cough and shortness of breath.    Cardiovascular:  Negative for chest pain and leg swelling.   Gastrointestinal:  Negative for abdominal pain, blood in stool, nausea and vomiting.   Genitourinary:  Negative for dysuria and hematuria.   Musculoskeletal:  Negative for arthralgias and myalgias.   Skin:  Negative for rash.   Neurological:  Negative for weakness, numbness and headaches.   Psychiatric/Behavioral:  Negative for dysphoric mood. The patient is not nervous/anxious.      Objective   Vitals:  /60 (BP Location: Left arm, Patient Position: Sitting)   Pulse 59   Temp 36.2 °C (97.2 °F)   Wt 109 kg (240 lb 3.2 oz)   LMP  (LMP Unknown)   SpO2 98%   BMI 39.36 kg/m²       Physical Exam  Vitals and nursing note reviewed. Chaperone present: Declines chaperone.   Constitutional:       General: She is not in acute distress.  HENT:      Right Ear: Tympanic membrane and ear  canal normal.      Left Ear: Tympanic membrane and ear canal normal.      Nose: Nose normal.      Mouth/Throat:      Mouth: Mucous membranes are moist.   Eyes:      Extraocular Movements: Extraocular movements intact.      Conjunctiva/sclera: Conjunctivae normal.      Pupils: Pupils are equal, round, and reactive to light.   Neck:      Vascular: No carotid bruit.   Cardiovascular:      Rate and Rhythm: Normal rate and regular rhythm.      Pulses:           Dorsalis pedis pulses are 2+ on the right side and 2+ on the left side.   Pulmonary:      Effort: Pulmonary effort is normal.      Breath sounds: Normal breath sounds.   Chest:   Breasts:     Right: Normal.      Left: Normal.   Abdominal:      General: Abdomen is flat. Bowel sounds are normal.      Palpations: Abdomen is soft.      Tenderness: There is no abdominal tenderness.   Musculoskeletal:         General: Normal range of motion.      Cervical back: Neck supple.      Right lower leg: Edema present.      Left lower leg: Edema present.   Lymphadenopathy:      Cervical: No cervical adenopathy.      Upper Body:      Right upper body: No supraclavicular or axillary adenopathy.      Left upper body: No supraclavicular or axillary adenopathy.   Skin:     General: Skin is warm.      Capillary Refill: Capillary refill takes less than 2 seconds.   Neurological:      General: No focal deficit present.      Mental Status: She is alert.   Psychiatric:         Mood and Affect: Mood normal.       Assessment & Plan  Routine general medical examination at a health care facility  Preventative measures discussed. Labs reviewed with patient. Immunizations discussed.        Hypothyroidism, unspecified type  Chronic. Continue Levothyroxine 75mcg daily. Recheck in 6 months.          Primary hypertension  Chronic. Continue Lisinopril 2.5 mg, Lasix 20mg every other day. DASH diet and 30 minutes of exercise 5x/week. Check home BP and keep log. Recheck in 6 months.          Type 2  diabetes mellitus with stage 3a chronic kidney disease, without long-term current use of insulin (Multi)  Chronic. Continue Jardiance 25mg, Metformin XR 1,000mg BID, Lantus 10U nightly.  Discontinue Januvia.  Start Mounjaro 2.5 mg/week. Risks and benefits of medication discussed and prescribed.  Low carbohydrate diet and increase in activity. Recheck in 1 month with Bina Lafleur RPH.  Recheck with me in 3 months.         Encounter for screening mammogram for breast cancer    Orders:    BI mammo bilateral screening tomosynthesis; Future    BRICEÑO (dyspnea on exertion)  Chronic, needs better control.  We discussed DASH diet, compression stockings, elevation of BLE to improve lower leg edema.  Referral to cardiology for further evaluation and management.    Orders:    Referral to Cardiology; Future    Mild intermittent asthma without complication (HHS-HCC)  Chronic, stable.  Continue albuterol as needed.    Orders:    albuterol (Ventolin HFA) 90 mcg/actuation inhaler; Inhale 2 puffs every 4 hours if needed for wheezing.    Gastroesophageal reflux disease, unspecified whether esophagitis present  Chronic, needs better control.  Discontinue Pepcid.  Start Prilosec 20 mg daily. Risks and benefits of medication discussed and prescribed.   Avoid food triggers and remain upright for at least 30 minutes after eating.  Follow-up with GI scheduled.    Orders:    omeprazole (PriLOSEC) 20 mg DR capsule; Take 1 capsule (20 mg) by mouth once daily. Do not crush or chew.    Joint pain in both hands  Chronic.  History elevated inflammatory markers, DENISE.  OTC Tylenol as needed for pain.  Will assess average heart rate high 80s  Referral to rheumatology for further evaluation and management.      Orders:    Referral to Rheumatology; Future    Iron deficiency anemia, unspecified iron deficiency anemia type  Chronic. Continue daily iron supplement. Recheck in 3 months, will recheck CBC and iron & TIBC at that time.

## 2025-03-12 NOTE — PROGRESS NOTES
"DM FOLLOW UP  E11.9    Kayli Senior is a 69 y.o. female here today at the request of Referring Provider: Gina Archuleta PA-C for my opinion regarding diabetes management.  My final recommendations will be communicated back to the requesting provider by way of shared medical record.     Patient  does not require VAF at this time, has dual medicare/medicaid    Subjective   Past Medical History:  She has a past medical history of Age-related nuclear cataract, bilateral (10/04/2019), Arthritis, Asthma, Chronic kidney disease, Diabetes mellitus (Multi), Personal history of irradiation (07/28/2017), and Thyrotoxicosis, unspecified without thyrotoxic crisis or storm (10/27/2017).    Social History:  She reports that she has never smoked. She has never been exposed to tobacco smoke. She has never used smokeless tobacco. She reports that she does not drink alcohol and does not use drugs.    Allergies:  Cjzyxfs-ixxpskzzd-rbvhgyfgbvvf, Buspirone hcl, Doxylamin-pse-dm-acetaminophen, House dust, and Bupropion    CURRENT PHARMACOTHERAPY   Current Outpatient Medications   Medication Instructions    albuterol (Ventolin HFA) 90 mcg/actuation inhaler 2 puffs, inhalation, Every 4 hours PRN    BD Ultra-Fine Diana Pen Needle 32 gauge x 5/32\" needle USE AS DIRECTED ONCE DAILY    cholecalciferol (Vitamin D-3) 25 mcg (1000 units) tablet 1 tablet, Daily    ciclopirox (Penlac) 8 % solution Apply to affected toenails daily, once per week remove with polish remover or nail file and repeat for 6 months    clobetasol (Temovate) 0.05 % cream 2 times daily    empagliflozin (Jardiance) 25 mg 1 tablet, Daily before breakfast    ferrous sulfate tablet 1 tablet, 2 times daily    fluticasone (Flonase) 50 mcg/actuation nasal spray 1 spray, Each Nostril, Daily, Shake gently. Before first use, prime pump. After use, clean tip and replace cap.    furosemide (LASIX) 20 mg, Every other day    Lantus U-100 Insulin 10 Units, subcutaneous, Nightly    " levothyroxine (SYNTHROID, LEVOXYL) 75 mcg, Daily RT    lisinopril 2.5 mg, Daily RT    metFORMIN XR (GLUCOPHAGE-XR) 1,000 mg, oral, 2 times daily    montelukast (Singulair) 10 mg tablet 1 tablet, Daily    omeprazole (PRILOSEC) 20 mg, oral, Daily, Do not crush or chew.    oxybutynin XL (DITROPAN-XL) 10 mg, Daily    SITagliptin phosphate (JANUVIA) 100 mg, Daily RT    vitamin B complex/folic acid (B COMPLEX 100 ORAL) Take by mouth.    white petrolatum (Aquaphor Healing) 41 % ointment ointment APPLY TOPICALLY AS NEEDED FOR DRY SKIN DAILY     Pt denies SE/intolerances  Reviewed all medications by prescribing practitioner (such as prescriptions, OTCs, herbal therapies, supplements) and documented in the medical record.     Reviewed need for secondary prevention: Statins, ACE-I/ARB, Aspirin    Glucose Monitoring  Patient is checking glucose 1 time per day.  Recent FBS:     Lifestyle:  Current diet: improving, trying to limit CHO foods and in general pt follows high protein, low carb diet  Current exercise: no regular exercise    Last Labs/Vitals/Meds  POC HEMOGLOBIN A1c (%)   Date Value   02/19/2025 7.0 (A)     Hemoglobin A1C (%)   Date Value   03/10/2022 6.5 (H)   11/24/2021 6.3 (H)   04/22/2021 7.3 (H)   11/09/2020 7.5 (H)   11/05/2019 7.0 (H)   10/23/2019 7.1 (H)     GLUCOSE (mg/dL)   Date Value   02/26/2025 95     CREATININE (mg/dL)   Date Value   02/26/2025 1.17 (H)     EGFR (mL/min/1.73m2)   Date Value   02/26/2025 51 (L)       BP Readings from Last 3 Encounters:   03/12/25 110/60   02/19/25 102/56   05/23/22 104/52        Wt Readings from Last 6 Encounters:   03/12/25 109 kg (240 lb 3.2 oz)   02/19/25 103 kg (226 lb)   03/22/24 99.8 kg (220 lb)   05/23/22 111 kg (245 lb)   04/18/22 114 kg (252 lb)   04/01/22 112 kg (248 lb)     BMI Readings from Last 6 Encounters:   03/12/25 39.36 kg/m²   02/19/25 37.04 kg/m²   03/22/24 36.61 kg/m²   05/23/22 38.95 kg/m²   04/18/22 40.06 kg/m²   04/01/22 39.43 kg/m²        Assessment:  Patients diabetes is reasonably well controlled with most recent A1c of 7.0% (Goal < 7%).  Compliance at present is estimated to be good  Discussed start weekly GLP-1 for improved glycemic control and additional weight loss.  Will need to discontinue DPP4 due to duplicate therapy.   Pt is agreeable.   Continue Jardiance 25mg daily, Lantus 10 units daily      Plan:  1.  START Mounjaro 2.5mg weekly   2.  STOP Januvia 100mg   3.  Continue all other medications at current dosages  4.  Follow up in 1 month with clinical pharmacist      Data reviewed and evaluated by LEMUEL Lafleur RPH, [facility]  Treatment and plan changes discussed with Gina Archuleta PA-C

## 2025-03-24 ENCOUNTER — APPOINTMENT (OUTPATIENT)
Dept: RADIOLOGY | Facility: CLINIC | Age: 70
End: 2025-03-24
Payer: COMMERCIAL

## 2025-04-14 ENCOUNTER — APPOINTMENT (OUTPATIENT)
Dept: PRIMARY CARE | Facility: CLINIC | Age: 70
End: 2025-04-14
Payer: COMMERCIAL

## 2025-04-16 ENCOUNTER — HOSPITAL ENCOUNTER (OUTPATIENT)
Dept: RADIOLOGY | Facility: CLINIC | Age: 70
Discharge: HOME | End: 2025-04-16
Payer: COMMERCIAL

## 2025-04-16 VITALS — BODY MASS INDEX: 39.33 KG/M2 | WEIGHT: 240 LBS

## 2025-04-16 DIAGNOSIS — Z12.31 ENCOUNTER FOR SCREENING MAMMOGRAM FOR BREAST CANCER: ICD-10-CM

## 2025-04-16 PROCEDURE — 77067 SCR MAMMO BI INCL CAD: CPT

## 2025-04-16 PROCEDURE — 77067 SCR MAMMO BI INCL CAD: CPT | Performed by: RADIOLOGY

## 2025-04-16 PROCEDURE — 77063 BREAST TOMOSYNTHESIS BI: CPT | Performed by: RADIOLOGY

## 2025-04-28 ENCOUNTER — OFFICE VISIT (OUTPATIENT)
Dept: CARDIOLOGY | Facility: CLINIC | Age: 70
End: 2025-04-28
Payer: COMMERCIAL

## 2025-04-28 VITALS
DIASTOLIC BLOOD PRESSURE: 66 MMHG | WEIGHT: 245 LBS | BODY MASS INDEX: 40.15 KG/M2 | HEART RATE: 89 BPM | SYSTOLIC BLOOD PRESSURE: 104 MMHG | OXYGEN SATURATION: 99 %

## 2025-04-28 DIAGNOSIS — R94.31 ABNORMAL EKG: ICD-10-CM

## 2025-04-28 DIAGNOSIS — Z78.9 STATIN INTOLERANCE: ICD-10-CM

## 2025-04-28 DIAGNOSIS — E66.01 MORBID OBESITY (MULTI): ICD-10-CM

## 2025-04-28 DIAGNOSIS — I25.10 CORONARY ARTERIOSCLEROSIS: ICD-10-CM

## 2025-04-28 DIAGNOSIS — I10 ESSENTIAL HYPERTENSION: ICD-10-CM

## 2025-04-28 DIAGNOSIS — R06.09 DOE (DYSPNEA ON EXERTION): Primary | ICD-10-CM

## 2025-04-28 PROCEDURE — 93005 ELECTROCARDIOGRAM TRACING: CPT | Performed by: STUDENT IN AN ORGANIZED HEALTH CARE EDUCATION/TRAINING PROGRAM

## 2025-04-28 PROCEDURE — 3074F SYST BP LT 130 MM HG: CPT | Performed by: STUDENT IN AN ORGANIZED HEALTH CARE EDUCATION/TRAINING PROGRAM

## 2025-04-28 PROCEDURE — 1159F MED LIST DOCD IN RCRD: CPT | Performed by: STUDENT IN AN ORGANIZED HEALTH CARE EDUCATION/TRAINING PROGRAM

## 2025-04-28 PROCEDURE — 3078F DIAST BP <80 MM HG: CPT | Performed by: STUDENT IN AN ORGANIZED HEALTH CARE EDUCATION/TRAINING PROGRAM

## 2025-04-28 PROCEDURE — 99204 OFFICE O/P NEW MOD 45 MIN: CPT | Performed by: STUDENT IN AN ORGANIZED HEALTH CARE EDUCATION/TRAINING PROGRAM

## 2025-04-28 PROCEDURE — 99214 OFFICE O/P EST MOD 30 MIN: CPT | Performed by: STUDENT IN AN ORGANIZED HEALTH CARE EDUCATION/TRAINING PROGRAM

## 2025-04-28 RX ORDER — FAMOTIDINE 40 MG/1
40 TABLET, FILM COATED ORAL DAILY
COMMUNITY

## 2025-04-28 RX ORDER — ROSUVASTATIN CALCIUM 20 MG/1
20 TABLET, COATED ORAL DAILY
Qty: 90 TABLET | Refills: 0 | Status: SHIPPED | OUTPATIENT
Start: 2025-04-28 | End: 2026-04-28

## 2025-04-28 NOTE — PROGRESS NOTES
Primary Care Physician: Gina Archuleta PA-C   Date of Visit: 2025  1:40 PM EDT  Type of Visit: new      Chief Complaint:  Chief Complaint   Patient presents with    Shortness of Breath        HPI  Kayli Senior 69 y.o. female  with hx of IDDM, well controlled, hypothyroidism and HTN referred for BRICEÑO     She was recently started on Mounjaro due to significant weight gain, she has not started taking it yet and she is afraid of side effects   She has been sob recently, over the last 2-3 months. No wheezing   No fever or chills   No chest pain or angina   Last week she had an episode of dizziness while she was walking, but did not pass out. She stopped walking.   She is on lasix, and dose was decreased to 20 mg every other day. but she quit taking it all together as she is afraid her BP will drop   She has some le edema  She sleep on chair  Never been tested for delfina  She does not check her bp  Never had ischemic eval  She stopped taking lipitor last year due to ms pain, and she did not try a different one.  No bleeding issues  Never smoked  No alcohol intake    She was told her her dad  at age 54 (she does know any details)    Review of Systems   Review of Systems   12 points review of systems are negative expect for the above    Social History:  Social History     Socioeconomic History    Marital status:      Spouse name: Not on file    Number of children: Not on file    Years of education: Not on file    Highest education level: Not on file   Occupational History    Not on file   Tobacco Use    Smoking status: Never     Passive exposure: Never    Smokeless tobacco: Never   Vaping Use    Vaping status: Never Used   Substance and Sexual Activity    Alcohol use: Never    Drug use: Never    Sexual activity: Not Currently     Partners: Male     Birth control/protection: Abstinence   Other Topics Concern    Not on file   Social History Narrative    Not on file     Social Drivers of Health  "    Financial Resource Strain: Not on File (2023)    Received from [facility]    Financial Resource Strain     Financial Resource Strain: 0   Food Insecurity: Not on File (2024)    Received from [facility]    Food Insecurity     Food: 0   Transportation Needs: High Risk (2024)    Received from [facility] SDOH Screening     Has lack of transportation kept you from medical appointments, meetings, work or from getting things needed for daily living? choose all that apply.: Yes, it has kept me from medical appointments or from getting my medications     Has lack of transportation kept you from medical appointments, meetings, work or from getting things needed for daily living? choose all that apply.: Yes, it has kept me from non-medical meetings, appointments, work or from getting things that i need   Physical Activity: Not on File (2021)    Received from [facility]    Physical Activity     Physical Activity: 0   Stress: Not on File (2023)    Received from [facility]    Stress     Stress: 0   Recent Concern: Stress - At Risk (2023)    Received from [facility]    Stress     Stress: 2   Social Connections: Not on File (2023)    Received from [facility]    Social Connections     Connectedness: 0   Intimate Partner Violence: Not on file   Housing Stability: Not on File (2023)    Received from [facility]    Housing Stability     Housin        Past Medical History:  Medical History[1]    Past Surgical History:  Surgical History[2]    Family History:  Family History[3]     Objective:       2022     9:20 AM 3/22/2024    12:09 PM 3/22/2024    12:33 PM 2025     1:16 PM 3/12/2025    10:36 AM 2025     8:41 AM 2025     1:41 PM   Vitals   Systolic 104   102 110  104   Diastolic 52   56 60  66   BP Location    Left arm Left arm     Heart Rate    70 59  89   Temp     36.2 °C (97.2 °F)     Resp    18      Height  1.651 m (5' 5\") 1.651 m (5' 5\") 1.664 m (5' 5.5\")      Weight (lb)  220 220 226 " "240.2 240 245   BMI  36.61 kg/m2 36.61 kg/m2 37.04 kg/m2 39.36 kg/m2 39.33 kg/m2 40.15 kg/m2   BSA (m2)  2.14 m2 2.14 m2 2.18 m2 2.24 m2 2.24 m2 2.26 m2   Visit Report    Report Report  Report      Constitutional:       Appearance: Healthy appearance. Not in distress.   Neck:      Vascular:  JVD normal.   Pulmonary:      Effort: Pulmonary effort is normal.      Breath sounds: Normal breath sounds. No wheezing. No rhonchi. No rales.   Chest:      Chest wall: Not tender to palpatation.   Cardiovascular:      Normal rate. Regular rhythm. Normal S1. Normal S2.       Murmurs: There is no murmur.      No gallop.  N No rub.   Pulses:     Intact distal pulses.   Edema:     Peripheral edema absent.   Abdominal:      General: Bowel sounds are normal.      Palpations: Abdomen is soft.      Tenderness: There is no abdominal tenderness.   Musculoskeletal: Normal range of motion.         General: No tenderness.   Skin:     General: Skin is warm and dry.   Neurological:      General: No focal deficit present.      Mental Status: Alert and oriented to person, place and time.   Psychiatry:     Normal Mood     Allergies:  Allergies[4]    Medications:  Current Outpatient Medications   Medication Instructions    albuterol (Ventolin HFA) 90 mcg/actuation inhaler 2 puffs, inhalation, Every 4 hours PRN    BD Ultra-Fine Diana Pen Needle 32 gauge x 5/32\" needle USE AS DIRECTED ONCE DAILY    cholecalciferol (Vitamin D-3) 25 mcg (1000 units) tablet 1 tablet, Daily    ciclopirox (Penlac) 8 % solution Apply to affected toenails daily, once per week remove with polish remover or nail file and repeat for 6 months    clobetasol (Temovate) 0.05 % cream 2 times daily    empagliflozin (Jardiance) 25 mg 1 tablet, Daily before breakfast    famotidine (PEPCID) 40 mg, Daily    ferrous sulfate tablet 1 tablet, 2 times daily    fluticasone (Flonase) 50 mcg/actuation nasal spray 1 spray, Each Nostril, Daily, Shake gently. Before first use, prime pump. After " "use, clean tip and replace cap.    furosemide (LASIX) 20 mg, Every other day    glucagon (Gvoke HypoPen 2-Pack) 1 mg/0.2 mL auto-injector Inject the contents of one pen under the skin if needed for low glucose emergency    Lantus U-100 Insulin 10 Units, subcutaneous, Nightly    levothyroxine (SYNTHROID, LEVOXYL) 75 mcg, Daily RT    lisinopril 2.5 mg, Daily RT    metFORMIN XR (GLUCOPHAGE-XR) 1,000 mg, oral, 2 times daily    montelukast (Singulair) 10 mg tablet 1 tablet, Daily    oxybutynin XL (DITROPAN-XL) 10 mg, Daily    rosuvastatin (CRESTOR) 20 mg, oral, Daily    SITagliptin phosphate (JANUVIA) 100 mg, oral, Daily        Labs and Imaging:     I reviewed the following labs  Lab Results   Component Value Date    WBC 6.1 02/26/2025    HGB 10.6 (L) 02/26/2025    HCT 33.0 (L) 02/26/2025     02/26/2025    CHOL 160 02/26/2025    TRIG 54 02/26/2025    HDL 56 02/26/2025    ALT 8 02/26/2025    AST 11 02/26/2025     02/26/2025    K 4.4 02/26/2025     02/26/2025    CREATININE 1.17 (H) 02/26/2025    BUN 22 02/26/2025    CO2 30 02/26/2025    TSH 0.73 02/26/2025    HGBA1C 7.0 (A) 02/19/2025    ALBUR 12 03/31/2023       I reviewed the following:  EKG:   Recent Labs     04/28/25  1340   ATRRATE 66   VENTRATE 66   PRINT 162   QRSDUR 88   QTCFRED 417   QTCCALCB 423     Encounter Date: 04/28/25   ECG 12 lead (Clinic Performed)   Result Value    Ventricular Rate 66    Atrial Rate 66    AL Interval 162    QRS Duration 88    QT Interval 404    QTC Calculation(Bazett) 423    P Axis 76    R Axis 10    T Axis 52    QRS Count 11    Q Onset 223    P Onset 142    P Offset 199    T Offset 425    QTC Fredericia 417    Narrative    Normal sinus rhythm  Low voltage QRS  Cannot rule out Anterior infarct , age undetermined  Abnormal ECG  No previous ECGs available     Echocardiogram: No results for input(s): \"EF\", \"LVIDD\", \"IVS\", \"RV\", \"RVFRWALLPKSP\", \"TAPSE\" in the last 53518 hours.  ECHOCARDIOGRAM     Narrative  Ordered by an " unspecified provider.    Coronary Angiography: No results found for this or any previous visit from the past 1800 days.    Right Heart Cath: No results found for this or any previous visit from the past 1800 days.    Cardiac Scoring:   CT cardiac scoring wo IV contrast 04/26/2024    Narrative  Interpreted By:  Allison Ramirez,  STUDY:  CT CARDIAC SCORING WO IV CONTRAST;  4/26/2024 10:10 am    INDICATION:  Signs/Symptoms:CT CARDIAC SCORING.  Screening    COMPARISON:  None.    ACCESSION NUMBER(S):  [id number]    ORDERING CLINICIAN:  SCOTTIE SAN    TECHNIQUE:  Using prospective ECG gating, CT scan of the coronary arteries was  performed without intravenous contrast. Coronary calcium scoring  was  performed according to the method of Agatston.    FINDINGS:  The score and distribution of calcium in the coronary arteries is as  follows:    LM 0,  ,  LCx 0,  RCA 0,    Total 318    The visualized mid/lower ascending thoracic aorta measures 3.2 cm in  diameter. The heart is normal in size. No pericardial effusion is  present.    No gross evidence of mediastinal or hilar lymphadenopathy or masses  is identified. There is a left basilar infiltrate seen posteriorly  and posteromedially with 2 small pulmonary nodules seen in left lower  lobe with the larger nodule measuring 2-1/2 mm in size.    The visualized subdiaphragmatic structures appear intact.    Impression  1. Coronary artery calcium score of 318  2. Left basilar infiltrate with 2 left lower lobe pulmonary nodules  measuring up to 2-1/2 mm in size.*.    *Coronary artery calcium scoring may be helpful in predicting the  risk for future coronary heart disease events.  According to the  American College of Cardiology Foundation Clinical Expert Consensus  Task Force, such testing provides important prognostic information in  patients with more than one coronary heart disease risk factor. The  coronary artery calcium score correlates with the annual risk of  "a  non-fatal myocardial infarction or coronary heart disease death.    Coronary artery score            Annual Risk    0-99                             0.4%  100-399                        1.3%  >400                            2.4%    These three \"breakpoints\" correspond to lower, intermediate and high  risk states for future coronary events.  Such information should be  used, along with appropriate clinical judgment, to make decisions  regarding the intensity of risk factor management strategies to treat  blood lipids and to modify other non-lipid coronary risk factors.    Reference: [place] P et al. Circulation.  2007; 115:402-426    MACRO:  Incidental Finding:  A non-calcified pulmonary nodule/multiple  non-calcified pulmonary nodules measuring less than 6 mm, likely  benign.  (**-YCF-**)    Instructions:  No further follow-up is required, however, if the  patient has high risk factors for primary lung malignancy, follow-up  noncontrast CT scan chest in 12 months may be obtained. (Candelario Haro et al., Guidelines for management of incidental pulmonary  nodules detected on CT images: From the Fleischner Society 2017,  Radiology. 2017 Jul;284 (1):228-243.) [id number]    Signed by: Allison Ramirez 4/29/2024 9:52 AM  Dictation workstation:   [id number]    Cardiac MRI: No results found for this or any previous visit from the past 1800 days.    Nuclear:No results found for this or any previous visit from the past 1800 days.    Metabolic Stress: No results found for this or any previous visit from the past 1800 days.        The ASCVD Risk score (Rashid SYLVESTER, et al., 2019) failed to calculate for the following reasons:    Unable to determine if patient is Non-      Assessment/Plan:   1. BRICEÑO (dyspnea on exertion)  Referral to Cardiology    ECG 12 lead (Clinic Performed)    Transthoracic echo (TTE) complete    Nuclear Stress Test    B-type natriuretic peptide    B-type natriuretic peptide    " XR chest 2 views      2. Essential hypertension  ECG 12 lead (Clinic Performed)    B-type natriuretic peptide    B-type natriuretic peptide      3. Morbid obesity (Multi)  B-type natriuretic peptide    B-type natriuretic peptide      4. Abnormal EKG  Transthoracic echo (TTE) complete    Nuclear Stress Test      5. Coronary arteriosclerosis  Transthoracic echo (TTE) complete    Nuclear Stress Test    B-type natriuretic peptide    B-type natriuretic peptide    rosuvastatin (Crestor) 20 mg tablet      6. Statin intolerance           Kayli Senior 69 y.o. female  with hx of obesity BMI 39, IDDM, well controlled, hypothyroidism, HTN and  (LAD) referred for BRICEÑO   She had myalgia with lipitor in the past   EKG with NSR and possible ant infarct     Plan  BP log and blood sugar check  Advised to continue taking lasix   Get an echo   Get a Stress nuc   Get BNP   Get CXR  Start crestor and she will get refills by her PCP. Recommend medication compliance    Will contact her with results     MDM: moderate     ____________________________________________________________  Neelam Naik MD   of Medicine  Senior Attending Physician   Division of Cardiovascular Medicine   [facility] [place]  [facility]         [1]   Past Medical History:  Diagnosis Date    Age-related nuclear cataract, bilateral 10/04/2019    Nuclear sclerosis of both eyes    Allergic 1990    Anemia 2000    Anxiety 1990    Arthritis 2000    Asthma 1990    Chronic kidney disease 2023    Depression 1990    Diabetes mellitus (Multi) 1984    Eczema 2023    GERD (gastroesophageal reflux disease) 1990    Personal history of irradiation 07/28/2017    S/P radioactive iodine thyroid ablation    Thyrotoxicosis, unspecified without thyrotoxic crisis or storm 10/27/2017    Hyperthyroidism   [2]   Past Surgical History:  Procedure Laterality Date    EYE SURGERY  2016    OTHER SURGICAL HISTORY  06/08/2020    Eye  surgery   [3]   Family History  Problem Relation Name Age of Onset    Diabetes Mother Beltran    [4]   Allergies  Allergen Reactions    Glcyefo-Yopqudmue-Ybllgpwmtfra Palpitations     Night quil    Buspirone Hcl Other     Rapid heart rate    Doxylamin-Pse-Dm-Acetaminophen Other     Rapid heart rate    House Dust Other     wheezing    Bupropion Palpitations     Other reaction(s): Chest pain   Palpitations

## 2025-04-29 PROBLEM — R20.0 NUMBNESS: Status: ACTIVE | Noted: 2025-04-29

## 2025-04-29 PROBLEM — M19.90 OSTEOARTHRITIS: Status: ACTIVE | Noted: 2023-03-17

## 2025-04-29 PROBLEM — E11.311 TYPE 2 DIABETES MELLITUS WITH UNSPECIFIED DIABETIC RETINOPATHY WITH MACULAR EDEMA: Status: ACTIVE | Noted: 2019-10-28

## 2025-04-29 PROBLEM — N32.81 OVERACTIVE BLADDER: Status: ACTIVE | Noted: 2025-04-29

## 2025-04-29 PROBLEM — H25.10 NUCLEAR SCLEROSIS: Status: ACTIVE | Noted: 2019-10-04

## 2025-04-29 LAB
ATRIAL RATE: 66 BPM
BNP SERPL-MCNC: 14 PG/ML
P AXIS: 76 DEGREES
P OFFSET: 199 MS
P ONSET: 142 MS
PR INTERVAL: 162 MS
Q ONSET: 223 MS
QRS COUNT: 11 BEATS
QRS DURATION: 88 MS
QT INTERVAL: 404 MS
QTC CALCULATION(BAZETT): 423 MS
QTC FREDERICIA: 417 MS
R AXIS: 10 DEGREES
T AXIS: 52 DEGREES
T OFFSET: 425 MS
VENTRICULAR RATE: 66 BPM

## 2025-05-12 DIAGNOSIS — E11.22 TYPE 2 DIABETES MELLITUS WITH STAGE 3A CHRONIC KIDNEY DISEASE, WITHOUT LONG-TERM CURRENT USE OF INSULIN (MULTI): ICD-10-CM

## 2025-05-12 DIAGNOSIS — N18.31 TYPE 2 DIABETES MELLITUS WITH STAGE 3A CHRONIC KIDNEY DISEASE, WITHOUT LONG-TERM CURRENT USE OF INSULIN (MULTI): ICD-10-CM

## 2025-05-12 DIAGNOSIS — D50.9 IRON DEFICIENCY ANEMIA, UNSPECIFIED IRON DEFICIENCY ANEMIA TYPE: ICD-10-CM

## 2025-05-13 ENCOUNTER — TELEPHONE (OUTPATIENT)
Dept: PRIMARY CARE | Facility: CLINIC | Age: 70
End: 2025-05-13
Payer: COMMERCIAL

## 2025-05-13 DIAGNOSIS — E11.22 TYPE 2 DIABETES MELLITUS WITH STAGE 3A CHRONIC KIDNEY DISEASE, WITHOUT LONG-TERM CURRENT USE OF INSULIN (MULTI): ICD-10-CM

## 2025-05-13 DIAGNOSIS — E03.9 HYPOTHYROIDISM, UNSPECIFIED TYPE: ICD-10-CM

## 2025-05-13 DIAGNOSIS — N18.31 TYPE 2 DIABETES MELLITUS WITH STAGE 3A CHRONIC KIDNEY DISEASE, WITHOUT LONG-TERM CURRENT USE OF INSULIN (MULTI): ICD-10-CM

## 2025-05-13 RX ORDER — LEVOTHYROXINE SODIUM 75 UG/1
75 TABLET ORAL
Qty: 30 TABLET | Refills: 0 | Status: SHIPPED | OUTPATIENT
Start: 2025-05-13 | End: 2025-06-12

## 2025-05-13 NOTE — TELEPHONE ENCOUNTER
Patient called RX line requesting the following refills to go to [facility]:    Mitch Hernandez  Levothyroxine 75 mg    Last seen on 3-12-25 and upcoming appointment 6-12-25

## 2025-05-14 ENCOUNTER — TELEPHONE (OUTPATIENT)
Dept: PRIMARY CARE | Facility: CLINIC | Age: 70
End: 2025-05-14

## 2025-05-14 ENCOUNTER — TELEMEDICINE CLINICAL SUPPORT (OUTPATIENT)
Dept: PRIMARY CARE | Facility: CLINIC | Age: 70
End: 2025-05-14
Payer: COMMERCIAL

## 2025-05-14 DIAGNOSIS — Z79.899 MEDICATION MANAGEMENT: ICD-10-CM

## 2025-05-14 DIAGNOSIS — E11.22 TYPE 2 DIABETES MELLITUS WITH STAGE 3A CHRONIC KIDNEY DISEASE, WITHOUT LONG-TERM CURRENT USE OF INSULIN (MULTI): Primary | ICD-10-CM

## 2025-05-14 DIAGNOSIS — N18.31 TYPE 2 DIABETES MELLITUS WITH STAGE 3A CHRONIC KIDNEY DISEASE, WITHOUT LONG-TERM CURRENT USE OF INSULIN (MULTI): Primary | ICD-10-CM

## 2025-05-14 RX ORDER — CHOLECALCIFEROL (VITAMIN D3) 25 MCG
25 TABLET ORAL DAILY
Qty: 30 TABLET | Refills: 11 | Status: SHIPPED | OUTPATIENT
Start: 2025-05-14

## 2025-05-14 RX ORDER — PEN NEEDLE, DIABETIC 31 GX5/16"
NEEDLE, DISPOSABLE MISCELLANEOUS
Qty: 100 EACH | Refills: 3 | Status: SHIPPED | OUTPATIENT
Start: 2025-05-14

## 2025-05-14 NOTE — TELEPHONE ENCOUNTER
Junaid called from [facility] pharmacy [phone] he sent over a RX for Januvia, Jardiance and Levothyroxine, he did not receive a response about Januvia, but did for the other 2, is she still taking Januvia ?

## 2025-05-14 NOTE — PROGRESS NOTES
MEDICATION MANAGEMENT    With patient's permission, this is a Telemedicine visit with audio. Provider located at office address. Patient located at their home address. All issues as documented below were discussed and addressed but limited physical exam was performed. If it was felt that the patient should be evaluated via face-to-face office appointment(s) they were directed to appropriate location.      Patient joins via phone today to review medications.  Pt reports that she normally gets her prescriptions from [facility].  The pharmacy makes pillpaks for her and the pharmacy told her that she needed several refills.      Called [facility] to confirm contents of pt pillpak.      Pt instructed to call office with any questions or problems.      Bina Lafleur  [facility]  Clinical Pharmacy Specialist  Certified [facility] and   [place] [id number] [phone]

## 2025-05-14 NOTE — TELEPHONE ENCOUNTER
Junaid called from [facility] [phone] he sent over a RX for Januvia, Jardiance and Levothyroxine, he did not receive a response about Januvia but did for the other 2, is she still taking Januvia ?

## 2025-05-16 ENCOUNTER — TELEPHONE (OUTPATIENT)
Dept: CARDIOLOGY | Facility: CLINIC | Age: 70
End: 2025-05-16
Payer: COMMERCIAL

## 2025-05-16 NOTE — TELEPHONE ENCOUNTER
----- Message from Neelam Naik sent at 5/8/2025  9:26 AM EDT -----  Can you let her know her BNP is normal I'm happy with the result of the test.   Thanks Maggi   ----- Message -----  From: [facility] Results In  Sent: 4/29/2025  10:29 AM EDT  To: Neelam Naik MD

## 2025-05-22 LAB
ATRIAL RATE: 66 BPM
P AXIS: 76 DEGREES
P OFFSET: 199 MS
P ONSET: 142 MS
PR INTERVAL: 162 MS
Q ONSET: 223 MS
QRS COUNT: 11 BEATS
QRS DURATION: 88 MS
QT INTERVAL: 404 MS
QTC CALCULATION(BAZETT): 423 MS
QTC FREDERICIA: 417 MS
R AXIS: 10 DEGREES
T AXIS: 52 DEGREES
T OFFSET: 425 MS
VENTRICULAR RATE: 66 BPM

## 2025-06-04 ENCOUNTER — APPOINTMENT (OUTPATIENT)
Dept: RADIOLOGY | Facility: HOSPITAL | Age: 70
End: 2025-06-04
Payer: COMMERCIAL

## 2025-06-04 ENCOUNTER — APPOINTMENT (OUTPATIENT)
Dept: CARDIOLOGY | Facility: HOSPITAL | Age: 70
End: 2025-06-04
Payer: COMMERCIAL

## 2025-06-05 ENCOUNTER — APPOINTMENT (OUTPATIENT)
Dept: RADIOLOGY | Facility: HOSPITAL | Age: 70
End: 2025-06-05
Payer: COMMERCIAL

## 2025-06-09 ENCOUNTER — TELEPHONE (OUTPATIENT)
Dept: PRIMARY CARE | Facility: CLINIC | Age: 70
End: 2025-06-09
Payer: COMMERCIAL

## 2025-06-09 DIAGNOSIS — E11.22 TYPE 2 DIABETES MELLITUS WITH STAGE 3A CHRONIC KIDNEY DISEASE, WITHOUT LONG-TERM CURRENT USE OF INSULIN (MULTI): ICD-10-CM

## 2025-06-09 DIAGNOSIS — E03.9 HYPOTHYROIDISM, UNSPECIFIED TYPE: ICD-10-CM

## 2025-06-09 DIAGNOSIS — N18.31 TYPE 2 DIABETES MELLITUS WITH STAGE 3A CHRONIC KIDNEY DISEASE, WITHOUT LONG-TERM CURRENT USE OF INSULIN (MULTI): ICD-10-CM

## 2025-06-09 RX ORDER — EMPAGLIFLOZIN 25 MG/1
TABLET, FILM COATED ORAL
Qty: 90 TABLET | Refills: 0 | Status: SHIPPED | OUTPATIENT
Start: 2025-06-09

## 2025-06-09 RX ORDER — LEVOTHYROXINE SODIUM 75 UG/1
75 TABLET ORAL
Qty: 90 TABLET | Refills: 0 | Status: SHIPPED | OUTPATIENT
Start: 2025-06-09 | End: 2025-09-07

## 2025-06-09 NOTE — TELEPHONE ENCOUNTER
Pharmacy called for refill for Jardiance 25 mg and for Levothyroxine 75 mcg, last ov 5/14/25, On license of [facility]

## 2025-06-10 RX ORDER — LEVOTHYROXINE SODIUM 75 UG/1
75 TABLET ORAL
Qty: 90 TABLET | Refills: 0 | OUTPATIENT
Start: 2025-06-10 | End: 2025-09-08

## 2025-06-11 ASSESSMENT — ENCOUNTER SYMPTOMS
NERVOUS/ANXIOUS: 0
SPEECH DIFFICULTY: 0
BLACKOUTS: 0
CONFUSION: 0
DIZZINESS: 0
WEAKNESS: 0
HEADACHES: 0
VISUAL CHANGE: 0
WEIGHT LOSS: 0
SEIZURES: 0
SWEATS: 0
HUNGER: 0
TREMORS: 0
FATIGUE: 0
POLYPHAGIA: 0
POLYDIPSIA: 0
BLURRED VISION: 0

## 2025-06-12 ENCOUNTER — TELEPHONE (OUTPATIENT)
Dept: PRIMARY CARE | Facility: CLINIC | Age: 70
End: 2025-06-12

## 2025-06-12 ENCOUNTER — APPOINTMENT (OUTPATIENT)
Dept: PRIMARY CARE | Facility: CLINIC | Age: 70
End: 2025-06-12
Payer: COMMERCIAL

## 2025-06-12 VITALS
OXYGEN SATURATION: 100 % | TEMPERATURE: 97 F | WEIGHT: 248 LBS | DIASTOLIC BLOOD PRESSURE: 60 MMHG | HEART RATE: 65 BPM | BODY MASS INDEX: 40.64 KG/M2 | SYSTOLIC BLOOD PRESSURE: 118 MMHG

## 2025-06-12 DIAGNOSIS — I10 PRIMARY HYPERTENSION: ICD-10-CM

## 2025-06-12 DIAGNOSIS — E78.00 HIGH CHOLESTEROL: ICD-10-CM

## 2025-06-12 DIAGNOSIS — N18.31 TYPE 2 DIABETES MELLITUS WITH STAGE 3A CHRONIC KIDNEY DISEASE, WITHOUT LONG-TERM CURRENT USE OF INSULIN (MULTI): Primary | ICD-10-CM

## 2025-06-12 DIAGNOSIS — N18.31 TYPE 2 DIABETES MELLITUS WITH STAGE 3A CHRONIC KIDNEY DISEASE, WITHOUT LONG-TERM CURRENT USE OF INSULIN (MULTI): ICD-10-CM

## 2025-06-12 DIAGNOSIS — E11.22 TYPE 2 DIABETES MELLITUS WITH STAGE 3A CHRONIC KIDNEY DISEASE, WITHOUT LONG-TERM CURRENT USE OF INSULIN (MULTI): ICD-10-CM

## 2025-06-12 DIAGNOSIS — E11.22 TYPE 2 DIABETES MELLITUS WITH STAGE 3A CHRONIC KIDNEY DISEASE, WITHOUT LONG-TERM CURRENT USE OF INSULIN (MULTI): Primary | ICD-10-CM

## 2025-06-12 DIAGNOSIS — D50.9 IRON DEFICIENCY ANEMIA, UNSPECIFIED IRON DEFICIENCY ANEMIA TYPE: ICD-10-CM

## 2025-06-12 LAB — POC HEMOGLOBIN A1C: 6.8 % (ref 4.2–6.5)

## 2025-06-12 PROCEDURE — 4010F ACE/ARB THERAPY RXD/TAKEN: CPT

## 2025-06-12 PROCEDURE — 1125F AMNT PAIN NOTED PAIN PRSNT: CPT

## 2025-06-12 PROCEDURE — 3078F DIAST BP <80 MM HG: CPT

## 2025-06-12 PROCEDURE — 3074F SYST BP LT 130 MM HG: CPT

## 2025-06-12 PROCEDURE — 83036 HEMOGLOBIN GLYCOSYLATED A1C: CPT

## 2025-06-12 PROCEDURE — 1159F MED LIST DOCD IN RCRD: CPT

## 2025-06-12 PROCEDURE — 3044F HG A1C LEVEL LT 7.0%: CPT

## 2025-06-12 PROCEDURE — G2211 COMPLEX E/M VISIT ADD ON: HCPCS

## 2025-06-12 PROCEDURE — 1036F TOBACCO NON-USER: CPT

## 2025-06-12 PROCEDURE — 99214 OFFICE O/P EST MOD 30 MIN: CPT

## 2025-06-12 RX ORDER — LANCETS
EACH MISCELLANEOUS
Qty: 100 EACH | Refills: 1 | Status: SHIPPED | OUTPATIENT
Start: 2025-06-12

## 2025-06-12 RX ORDER — IBUPROFEN 200 MG
CAPSULE ORAL
Qty: 100 EACH | Refills: 1 | Status: SHIPPED | OUTPATIENT
Start: 2025-06-12

## 2025-06-12 ASSESSMENT — PATIENT HEALTH QUESTIONNAIRE - PHQ9
2. FEELING DOWN, DEPRESSED OR HOPELESS: SEVERAL DAYS
SUM OF ALL RESPONSES TO PHQ9 QUESTIONS 1 AND 2: 2
1. LITTLE INTEREST OR PLEASURE IN DOING THINGS: SEVERAL DAYS

## 2025-06-12 ASSESSMENT — ENCOUNTER SYMPTOMS
WEAKNESS: 0
CONFUSION: 0
WEIGHT LOSS: 0
TREMORS: 0
DIZZINESS: 0
POLYDIPSIA: 0
SEIZURES: 0
BLACKOUTS: 0
HUNGER: 0
HEADACHES: 0
VISUAL CHANGE: 0
FATIGUE: 0
SWEATS: 0
NERVOUS/ANXIOUS: 0
POLYPHAGIA: 0
BLURRED VISION: 0
SPEECH DIFFICULTY: 0

## 2025-06-12 ASSESSMENT — PAIN SCALES - GENERAL: PAINLEVEL_OUTOF10: 2

## 2025-06-12 NOTE — ASSESSMENT & PLAN NOTE
Chronic, improving. Continue Lantus 10 units nightly, Januvia 100 mg daily, Jardiance 25 mg daily, metformin  mg twice daily.  We did discuss addition of Mounjaro for assistance with weight loss, patient declines at this time.  Low carbohydrate diet and increase in activity. Recheck in 3 months.     Orders:    POCT glycosylated hemoglobin (Hb A1C) manually resulted    Collection of Capillary Blood Specimen    blood sugar diagnostic (Blood Glucose Test); Check fasting blood sugar once daily.    lancets misc; Check fasting blood sugar once daily.

## 2025-06-12 NOTE — PROGRESS NOTES
Subjective   Patient ID: Kayli Senior is a 69 y.o. female who presents for Follow-up (Anemia/DM check.).    Diabetes  She has type 2 diabetes mellitus. No MedicAlert identification noted. The initial diagnosis of diabetes was made 40 years ago. Pertinent negatives for hypoglycemia include no confusion, dizziness, headaches, hunger, mood changes, nervousness/anxiousness, pallor, seizures, sleepiness, speech difficulty, sweats or tremors. Pertinent negatives for diabetes include no blurred vision, no chest pain, no fatigue, no foot paresthesias, no foot ulcerations, no polydipsia, no polyphagia, no polyuria, no visual change, no weakness and no weight loss. Pertinent negatives for hypoglycemia complications include no blackouts, no hospitalization, no nocturnal hypoglycemia, no required assistance and no required glucagon injection. Symptoms are stable. Diabetic complications include nephropathy and retinopathy. Pertinent negatives for diabetic complications include no CVA, heart disease, impotence, peripheral neuropathy or PVD. Risk factors for coronary artery disease include dyslipidemia, family history, obesity, post-menopausal and stress. Current diabetic treatment includes diet, insulin injections and oral agent (triple therapy). She is compliant with treatment most of the time. She is currently taking insulin at bedtime. Insulin injections are given by patient. Rotation sites for injection include the abdominal wall. Her weight is increasing steadily. She is following a generally unhealthy diet. When asked about meal planning, she reported none. She has had a previous visit with a dietitian. She participates in exercise daily. She monitors blood glucose at home 1-2 x per week. She monitors urine at home <1 x per month. There is no compliance with monitoring of blood glucose. Her home blood glucose trend is fluctuating minimally. Her breakfast blood glucose is taken after 10 am. Her breakfast blood glucose  range is generally 180-200 mg/dl. Her lunch blood glucose is taken after 3 pm. Her lunch blood glucose range is generally 180-200 mg/dl. Her dinner blood glucose is taken between 7-8 pm. Her dinner blood glucose range is generally 180-200 mg/dl. Her bedtime blood glucose is taken after 11 pm. Her bedtime blood glucose range is generally 180-200 mg/dl. Her overall blood glucose range is 180-200 mg/dl. She sees a podiatrist.Eye exam is current.      DM: On Lantus 10 units nightly, Januvia 100 mg daily, Jardiance 25 mg daily, metformin  mg twice daily. Checks home BS occasionally, 180. Eye exam UTD. Foot exam UTD. Denies chest pain, SOB, dizziness, lightheadedness, nausea, vomiting, hypoglycemic episodes.   Lab Results   Component Value Date    HGBA1C 6.8 (A) 06/12/2025    HGBA1C 7.0 (A) 02/19/2025    HGBA1C 6.5 (H) 03/10/2022     Lab Results   Component Value Date    CREATININE 1.17 (H) 02/26/2025    GLUCOSE 95 02/26/2025    EGFR 51 (L) 02/26/2025     BMI Readings from Last 6 Encounters:   06/12/25 40.64 kg/m²   04/28/25 40.15 kg/m²   04/16/25 39.33 kg/m²   03/12/25 39.36 kg/m²   02/19/25 37.04 kg/m²   03/22/24 36.61 kg/m²     ALBUMIN/CREATININE RATIO, RANDOM URINE   Date Value Ref Range Status   02/26/2025 NOTE <30 mg/g creat Final     Comment:     NOTE: The urine albumin value is less than   0.2 mg/dL therefore we are unable to calculate   excretion and/or creatinine ratio.     The ADA defines abnormalities in albumin  excretion as follows:     Albuminuria Category        Result (mg/g creatinine)     Normal to Mildly increased   <30  Moderately increased            Severely increased           > OR = 300     The ADA recommends that at least two of three  specimens collected within a 3-6 month period be  abnormal before considering a patient to be  within a diagnostic category.       Albumin/Creatine Ratio   Date Value Ref Range Status   03/31/2023 15.7 0 - 30 MG/G Final     Comment:     30 to 300 mg/g  indicates an increased risk for diabetic nephropathy.   Greater than 300 mg/g is consistent with clinical nephropathy. (Am J   Kidney Disease 1995, 25:107)     05/03/2021 44.0 (H) 0 - 30 MG/G Final     Comment:     30 to 300 mg/g indicates an increased risk for diabetic nephropathy.   Greater than 300 mg/g is consistent with clinical nephropathy. (Am J   Kidney Disease 1995, 25:107)       VALORIE: Most recent hemoglobin 10.6.  Labs not completed prior to visit.    Review of Systems   Constitutional:  Negative for fatigue and weight loss.   Eyes:  Negative for blurred vision.   Cardiovascular:  Negative for chest pain.   Endocrine: Negative for polydipsia, polyphagia and polyuria.   Genitourinary:  Negative for impotence.   Skin:  Negative for pallor.   Neurological:  Negative for dizziness, tremors, seizures, speech difficulty, weakness and headaches.   Psychiatric/Behavioral:  Negative for confusion. The patient is not nervous/anxious.      Objective   /60 (BP Location: Left arm)   Pulse 65   Temp 36.1 °C (97 °F) (Temporal)   Wt 112 kg (248 lb)   LMP  (LMP Unknown)   SpO2 100%   BMI 40.64 kg/m²     Physical Exam  Vitals and nursing note reviewed.   Constitutional:       General: She is not in acute distress.     Appearance: Normal appearance. She is obese.   Eyes:      Extraocular Movements: Extraocular movements intact.      Conjunctiva/sclera: Conjunctivae normal.   Neck:      Vascular: No carotid bruit.   Cardiovascular:      Rate and Rhythm: Normal rate and regular rhythm.   Pulmonary:      Effort: Pulmonary effort is normal.      Breath sounds: Normal breath sounds.   Musculoskeletal:      Cervical back: Neck supple.      Right lower leg: Edema present.      Left lower leg: Edema present.   Neurological:      General: No focal deficit present.      Mental Status: She is alert.   Psychiatric:         Mood and Affect: Mood normal.         Assessment/Plan   Assessment & Plan  Type 2 diabetes mellitus with  stage 3a chronic kidney disease, without long-term current use of insulin (Multi)  Chronic, improving. Continue Lantus 10 units nightly, Januvia 100 mg daily, Jardiance 25 mg daily, metformin  mg twice daily.  We did discuss addition of Mounjaro for assistance with weight loss, patient declines at this time.  Low carbohydrate diet and increase in activity. Recheck in 3 months.     Orders:    POCT glycosylated hemoglobin (Hb A1C) manually resulted    Collection of Capillary Blood Specimen    blood sugar diagnostic (Blood Glucose Test); Check fasting blood sugar once daily.    lancets misc; Check fasting blood sugar once daily.    Iron deficiency anemia, unspecified iron deficiency anemia type  Chronic.  Lab orders pending, will follow-up results.

## 2025-06-13 LAB
ALBUMIN SERPL-MCNC: 4.1 G/DL (ref 3.6–5.1)
ALP SERPL-CCNC: 113 U/L (ref 37–153)
ALT SERPL-CCNC: 20 U/L (ref 6–29)
ANION GAP SERPL CALCULATED.4IONS-SCNC: 8 MMOL/L (CALC) (ref 7–17)
AST SERPL-CCNC: 25 U/L (ref 10–35)
BASOPHILS # BLD AUTO: 28 CELLS/UL (ref 0–200)
BASOPHILS NFR BLD AUTO: 0.4 %
BILIRUB SERPL-MCNC: 0.3 MG/DL (ref 0.2–1.2)
BUN SERPL-MCNC: 21 MG/DL (ref 7–25)
CALCIUM SERPL-MCNC: 9.6 MG/DL (ref 8.6–10.4)
CHLORIDE SERPL-SCNC: 107 MMOL/L (ref 98–110)
CO2 SERPL-SCNC: 27 MMOL/L (ref 20–32)
CREAT SERPL-MCNC: 1.2 MG/DL (ref 0.5–1.05)
EGFRCR SERPLBLD CKD-EPI 2021: 50 ML/MIN/1.73M2
EOSINOPHIL # BLD AUTO: 168 CELLS/UL (ref 15–500)
EOSINOPHIL NFR BLD AUTO: 2.4 %
ERYTHROCYTE [DISTWIDTH] IN BLOOD BY AUTOMATED COUNT: 14.6 % (ref 11–15)
FOLATE SERPL-MCNC: 10.5 NG/ML
GLUCOSE SERPL-MCNC: 125 MG/DL (ref 65–99)
HCT VFR BLD AUTO: 35.2 % (ref 35–45)
HGB BLD-MCNC: 10.8 G/DL (ref 11.7–15.5)
IRON SATN MFR SERPL: 16 % (CALC) (ref 16–45)
IRON SERPL-MCNC: 52 MCG/DL (ref 45–160)
LYMPHOCYTES # BLD AUTO: 1141 CELLS/UL (ref 850–3900)
LYMPHOCYTES NFR BLD AUTO: 16.3 %
MCH RBC QN AUTO: 27.7 PG (ref 27–33)
MCHC RBC AUTO-ENTMCNC: 30.7 G/DL (ref 32–36)
MCV RBC AUTO: 90.3 FL (ref 80–100)
MONOCYTES # BLD AUTO: 469 CELLS/UL (ref 200–950)
MONOCYTES NFR BLD AUTO: 6.7 %
NEUTROPHILS # BLD AUTO: 5194 CELLS/UL (ref 1500–7800)
NEUTROPHILS NFR BLD AUTO: 74.2 %
PLATELET # BLD AUTO: 288 THOUSAND/UL (ref 140–400)
PMV BLD REES-ECKER: 10.5 FL (ref 7.5–12.5)
POTASSIUM SERPL-SCNC: 4.4 MMOL/L (ref 3.5–5.3)
PROT SERPL-MCNC: 6.7 G/DL (ref 6.1–8.1)
RBC # BLD AUTO: 3.9 MILLION/UL (ref 3.8–5.1)
SODIUM SERPL-SCNC: 142 MMOL/L (ref 135–146)
TIBC SERPL-MCNC: 330 MCG/DL (CALC) (ref 250–450)
VIT B12 SERPL-MCNC: 258 PG/ML (ref 200–1100)
WBC # BLD AUTO: 7 THOUSAND/UL (ref 3.8–10.8)

## 2025-06-17 ENCOUNTER — TELEPHONE (OUTPATIENT)
Dept: PRIMARY CARE | Facility: CLINIC | Age: 70
End: 2025-06-17
Payer: COMMERCIAL

## 2025-06-17 DIAGNOSIS — R79.89 LOW VITAMIN B12 LEVEL: ICD-10-CM

## 2025-06-17 DIAGNOSIS — D50.9 IRON DEFICIENCY ANEMIA, UNSPECIFIED IRON DEFICIENCY ANEMIA TYPE: ICD-10-CM

## 2025-06-17 DIAGNOSIS — E11.3299 NONPROLIFERATIVE DIABETIC RETINOPATHY: ICD-10-CM

## 2025-06-17 NOTE — TELEPHONE ENCOUNTER
----- Message from Gina Archuleta sent at 6/15/2025  3:16 PM EDT -----  Please call patient.   Kidney function stable. Continue current medications, avoid NSAIDs and be sure to stay hydrated.   Hemoglobin is mildly low. May consider starting OTC Vitamin B12 1,000mcg daily as B12 is on low end of normal. Continue iron supplement as directed. Recheck in 3 months. Please pend CBC, vit b12,   iron and TIBC.   ----- Message -----  From: [facility]  Sent: 6/13/2025   3:27 AM EDT  To: Gina Archuleta PA-C

## 2025-06-17 NOTE — TELEPHONE ENCOUNTER
Patient stated she received a message to give the office a call.     Patient can be reached at [phone].

## 2025-06-17 NOTE — TELEPHONE ENCOUNTER
Patient returning call from MA yesterday 06/16. Patient is out and about today, she will try to keep her phone with her.

## 2025-06-24 NOTE — PROGRESS NOTES
History Of Present Illness  Kayli Senior is a 69 y.o. female presenting to GI clinic with a chief complaint of IBS-D and occasional dysphagia. Here with .    Patient complains of history of IBS. She states she has constipation and occasional diarrhea. When she would have diarrheal flare ups, she wouldn't eat at work and would avoid leaving the house. She would eat yogurt and crackers and would avoid fruits and vegetables because they worsened symptoms. She would wait until she got home from work to eat and take her pills.     Currently she complains of cramping and bloating and gas, and she states that carbohydrate rich foods can make this worse.  She was only having bowel movements once every 4 days so her PCP recommended her to start a probiotic supplement.  She is taking a Garden of Life 25 billion CFU probiotic daily and is having more frequent bms, now daily, but still has the bloating and gas.  Stool consistency is either BSS 1, 3, 7, it differs every time.  Beano has not helped symptoms. She is also taking Lactaid. She has tried pepcid and dicyclomine for symptoms which were ineffective in the past as well.    Patient has been having issues with fecal incontinence now for the past 3 or so years. She has had a colonoscopy to investigate this. The incontinence is happening daily. She will think she is cleaned up after BM, but then she will have to pee later in the day and notices that she had a bm. Sometimes stool is in her underpants and other times it is when wiping.  She is not normally aware occurs.  She is taking imodium as needed for the incontinence and feels like it makes her constipation worse.      Notes that at one point she was vegan and she tries to follow a plant rich vegetarian diet.  Legumes can be bothersome for the bloating.    Patient also reports some difficulty swallowing at times, food will feel like it gets stuck in her esophagus.  It was worse years ago, and she has had  an EGD with dilation for this back in 2017.  It still happens occasionally with breads and foods like roast beef.  She does not want to complete an EGD at this time.     Past medical history  Past Medical History:   Diagnosis Date    Age-related nuclear cataract, bilateral 10/04/2019    Nuclear sclerosis of both eyes    Allergic 1990    Anemia 2000    Anxiety 1990    Arthritis 2000    Asthma 1990    Chronic kidney disease 2023    Depression 1990    Diabetes mellitus (Multi) 1984    Eczema 2023    GERD (gastroesophageal reflux disease) 1990    Personal history of irradiation 07/28/2017    S/P radioactive iodine thyroid ablation    Thyrotoxicosis, unspecified without thyrotoxic crisis or storm 10/27/2017    Hyperthyroidism    Trigger finger [id number]      Past surgical history  Past Surgical History:   Procedure Laterality Date    CARPAL TUNNEL RELEASE  01/2016    EYE SURGERY  2016    OTHER SURGICAL HISTORY  06/08/2020    Eye surgery      Social History  She reports that she has never smoked. She has never been exposed to tobacco smoke. She has never used smokeless tobacco. She reports that she does not drink alcohol and does not use drugs.  She does not take NSAIDs on a regular basis    Family History  Family History[1]    The patient does not have a FH of CRC. she does not have a FH of IBD    Review of Systems   HENT:  Positive for trouble swallowing.    Gastrointestinal:  Positive for abdominal distention, abdominal pain, constipation and diarrhea. Negative for anal bleeding and blood in stool.        See HPI   All other systems reviewed and are negative.        Physical Exam  Constitutional:       General: She is not in acute distress.     Appearance: Normal appearance. She is obese. She is not ill-appearing.   HENT:      Head: Normocephalic and atraumatic.      Mouth/Throat:      Mouth: Mucous membranes are moist.   Eyes:      General: No scleral icterus.     Conjunctiva/sclera: Conjunctivae normal.      Pupils:  "Pupils are equal, round, and reactive to light.   Pulmonary:      Effort: Pulmonary effort is normal.   Abdominal:      General: Bowel sounds are normal. There is no distension.      Palpations: Abdomen is soft. There is no mass.      Tenderness: There is no abdominal tenderness.      Hernia: No hernia is present.   Musculoskeletal:         General: Normal range of motion.      Cervical back: Normal range of motion.   Skin:     General: Skin is warm and dry.      Coloration: Skin is not jaundiced or pale.   Neurological:      Mental Status: She is alert. Mental status is at baseline.   Psychiatric:         Mood and Affect: Mood normal.         Behavior: Behavior normal.          Last Vital Signs  /67 (BP Location: Left arm, Patient Position: Sitting, BP Cuff Size: Large adult)   Pulse 54   Resp 18   Ht 1.664 m (5' 5.5\")   Wt 113 kg (250 lb)   LMP  (LMP Unknown)   SpO2 100%   BMI 40.97 kg/m²      Relevant Results  Lab Results   Component Value Date    WBC 7.0 06/12/2025    HGB 10.8 (L) 06/12/2025    HCT 35.2 06/12/2025    MCV 90.3 06/12/2025     06/12/2025       Lab Results   Component Value Date    GLUCOSE 125 (H) 06/12/2025    CALCIUM 9.6 06/12/2025     06/12/2025    K 4.4 06/12/2025    CO2 27 06/12/2025     06/12/2025    BUN 21 06/12/2025    CREATININE 1.20 (H) 06/12/2025       Lab Results   Component Value Date    ALT 20 06/12/2025    AST 25 06/12/2025    ALKPHOS 113 06/12/2025    BILITOT 0.3 06/12/2025       Lab Results   Component Value Date    IRON 52 06/12/2025    TIBC 330 06/12/2025    IRONSAT 16 06/12/2025    [id number] 258 06/12/2025    FOLATE 10.5 06/12/2025     No results found for: \"CALPS\"     CRP   Date Value Ref Range Status   04/18/2022 0.18 mg/dL Final     Comment:     REF VALUE  < 1.00     07/05/2019 0.18 mg/dL Final     Comment:     REF VALUE  < 1.00        No results found for: \"LIPASE\"    Lab Results   Component Value Date    HGBA1C 6.8 (A) 06/12/2025    [facility] " 7.0 (A) 02/19/2025    HGBA1C 6.5 (H) 03/10/2022    HGBA1C 6.3 (H) 11/24/2021    HGBA1C 7.3 (H) 04/22/2021 01/07/22 10:33 01/07/22 10:34   Calprotectin, Fecal <27.1  Reference range: <50.0  Unit: mg/kg    Leukocyte Ct., Fecal NO WBC SEEN    Transglutaminase IgA  10  Reference range: <20  Unit: Units   Transglutaminase IgG  3  Reference range: <20  Unit: Units     EGD/COLONOSCOPY/COLOGUARD  Colonoscopy 1/11/2022 with Dr. Rossi (chronic diarrhea)-normal.  Biopsies negative for microscopic colitis.  10-year follow-up    Colonoscopy 6/23/2017-   normal per chart review    EGD with dilation in past for occasional dysphagia. Found dilation helpful.       Assessment/Plan    Assessment & Plan  Irritable bowel syndrome with both constipation and diarrhea  Longstanding history of irritable bowel syndrome, currently having regular bowel movements but still has a significant amount of bloating and abdominal discomfort until she has bowel movements.  Having bowel movements daily but consistency has been irregular and she is having stool incontinence.    She is not on a fiber supplement but does take flaxseed daily, recommend switching over to Metamucil capsules since she reports that she is on a fluid restriction for her kidney disease per nephrology  She can also try encapsulated peppermint oil for bloating but recommend avoiding trigger foods like carbohydrate rich foods and raw vegetables  Dicyclomine has been ineffective in the past, consider Xifaxan if diarrheal symptoms persist and continued side effects from Imodium  Orders:    Referral to Gastroenterology    psyllium (MetamuciL) 0.4 gram capsule; Take 3-6 capsules by mouth once daily. Take with plenty of water.    Dysphagia, unspecified type  Reported history of dysphagia for many years now, and had EGD with dilation in the past.  She does not want to complete an EGD at this time because she does not feel that dysphagia is frequent but is amenable to completing an  esophagram for further evaluation and to rule out stricture  Continue Pepcid daily and avoiding trigger foods at this time  Orders:    [place] GI esophagram; Future    Follow-up 2 to 3 months      KEN Haq-ANA CRISTINA  06/27/25          [1]   Family History  Problem Relation Name Age of Onset    Diabetes Mother Devin

## 2025-06-25 ENCOUNTER — APPOINTMENT (OUTPATIENT)
Facility: CLINIC | Age: 70
End: 2025-06-25
Payer: COMMERCIAL

## 2025-06-25 DIAGNOSIS — M25.541 JOINT PAIN IN BOTH HANDS: ICD-10-CM

## 2025-06-25 DIAGNOSIS — M25.542 JOINT PAIN IN BOTH HANDS: ICD-10-CM

## 2025-06-25 PROCEDURE — 4010F ACE/ARB THERAPY RXD/TAKEN: CPT | Performed by: INTERNAL MEDICINE

## 2025-06-25 PROCEDURE — 99205 OFFICE O/P NEW HI 60 MIN: CPT | Performed by: INTERNAL MEDICINE

## 2025-06-25 PROCEDURE — 1036F TOBACCO NON-USER: CPT | Performed by: INTERNAL MEDICINE

## 2025-06-25 PROCEDURE — 3044F HG A1C LEVEL LT 7.0%: CPT | Performed by: INTERNAL MEDICINE

## 2025-06-25 NOTE — PROGRESS NOTES
Subjective   Patient ID: [id number]   Kayli Senior is a 69 y.o. female who presents for No chief complaint on file..    Virtual or Telephone Consent    An interactive audio and video telecommunication system which permits real time communications between the patient (at the originating site) and provider (at the distant site) was utilized to provide this telehealth service.   Verbal consent was requested and obtained from Kayli Senior on this date, 06/25/25 for a telehealth visit and the patient's location was confirmed at the time of the visit.     HPI  Recall:   Initially found to have positive DENISE in 2016. Saw Dr. Hsieh at  and was thought to be false positive. Followed up with her again in 2022. She later saw Dr. Barrow who did not think she had evidence of an inflammatory arthritis or CTD.     She had carpal tunnel surgery done b/l 2 years apart, and every time the surgeon would ask if she has rheumatoid arthritis in both procedures. She is worried that her finger on the L hand is deformed. 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   She also reports that when she was working with cold items her hands would flare and hurt. 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 Prior to that she used to do .     Most of her pain is in her fingers, Ips? palm of her hands at site of A1 pulleys, L>R, worse in the winter, and significantly worse with overuse, she reports having trigger fingers and trigger thumbs and had CSI for them. No pain in MCPs or DIPs. Reports bony enlargement of L 3rd PIP.   Cannot hold her phone for too long. Uses arthritis gloves.   No AM stiffness in the summer but reports some  stiffness in the winter.     ROS  Denies fever, chills, weight loss, night sweats. No fatigue, feels refreshed in AM. + dry eyes. No ocular inflx. + dry mouth, no dental loss, loss of taste, nasal or oral ulcers, difficulty swallowing with dry crackers. + chest pain and SOB on exertion and is going to get a stress test. , cough, asthma, or recurrent respiratory infections. Denies  nausea, vomiting, abdominal pain, + constipation alternating with diarrhea. No melena or hematochezia. No recurrent urinary infections or STDs, no genital or anal ulcers. Reports venous stasis changes. + Raynaud's phenomenon, no digital ulcers, psoriatic lesions. Denies any numbness or tingling. Denies history of clots (arterial or venous), or miscarriages.    PMH/PSH: T2DM, DM retinopathy, HLD, asthma, GERD, hypothyroidism, IBS, CKD, Hx of Grave's disease, b/l carpal tunnel release    Social: Nonsmoker, no alcohol, no drug use. 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   FHx: No family history of autoimmune diseases       Problem List[1]     Medical History[2]     Surgical History[3]     Social History     Socioeconomic History    Marital status:      Spouse name: Not on file    Number of children: Not on file    Years of education: Not on file    Highest education level: Not on file   Occupational History    Not on file   Tobacco Use    Smoking status: Never     Passive exposure: Never    Smokeless tobacco: Never   Vaping Use    Vaping status: Never Used   Substance and Sexual Activity    Alcohol use: Never    Drug use: Never    Sexual activity: Not Currently     Partners: Male     Birth control/protection: Abstinence   Other Topics Concern    Not on file   Social History Narrative    Not on file     Social Drivers of Health     Financial Resource Strain: Not on File (2/24/2023)    Received from [employer]    Financial Resource Strain     Financial Resource Strain: 0   Food Insecurity: Not on File (9/26/2024)    Received from [employer]     Food Insecurity     Food: 0   Transportation Needs: High Risk (2024)    Received from [facility] SDOH Screening     Has lack of transportation kept you from medical appointments, meetings, work or from getting things needed for daily living? choose all that apply.: Yes, it has kept me from medical appointments or from getting my medications     Has lack of transportation kept you from medical appointments, meetings, work or from getting things needed for daily living? choose all that apply.: Yes, it has kept me from non-medical meetings, appointments, work or from getting things that i need   Physical Activity: Not on File (2021)    Received from [facility]    Physical Activity     Physical Activity: 0   Stress: Not on File (2023)    Received from [facility]    Stress     Stress: 0   Recent Concern: Stress - At Risk (2023)    Received from [facility]    Stress     Stress: 2   Social Connections: Not on File (2023)    Received from [facility]    Social Connections     Connectedness: 0   Intimate Partner Violence: Not on file   Housing Stability: Not on File (2023)    Received from [facility]    Housing Stability     Housin        RX Allergies[4]     Current Medications[5]       Objective     There were no vitals taken for this visit.     Physical Exam  L 3rd PIP with bony enlargement, no evidence of swelling or erythem or warmth virtually      Lab Results   Component Value Date    WBC 7.0 2025    HGB 10.8 (L) 2025    HCT 35.2 2025    MCV 90.3 2025     2025        Chemistry    Lab Results   Component Value Date/Time     2025 1140    K 4.4 2025 1140     2025 1140    CO2 27 2025 1140    BUN 21 2025 1140    CREATININE 1.20 (H) 2025 1140    Lab Results   Component Value Date/Time    CALCIUM 9.6 2025 1140    ALKPHOS 113 2025 1140    AST 25 2025 1140    ALT 20 2025 1140    BILITOT 0.3  "06/12/2025 1140           Lab Results   Component Value Date    CRP 0.18 04/18/2022      Lab Results   Component Value Date    DENISE (A) 03/31/2023     POSITIVE  Reference range: NEGATIVE    Anti-nuclear antibody test is used as an aid in diagnosis of systemic  autoimmune diseases. Where positive and clinically warranted,  follow-up using disease-specific testing is recommended. Low positive  titers are not uncommon with advanced age, certain chronic  infections, and malignancies among others.     Test methodology: Indirect fluorescence immunoassay (IFA) using HEp-2  cells.      RF 14 04/18/2022      No results found for: \"CKTOTAL\"  Lab Results   Component Value Date    NEUTROABS 3.31 04/18/2022      No results found for: \"FERRITIN\"   No results found for: \"HEPATOT\", \"HEPAIGM\", \"HEPBCIGM\", \"HEPBCAB\", \"HBEAG\", \"HEPCAB\"   Lab Results   Component Value Date    ALT 20 06/12/2025    AST 25 06/12/2025    ALKPHOS 113 06/12/2025    BILITOT 0.3 06/12/2025      No results found for: \"PPD\"   No results found for: \"URICACID\"   Lab Results   Component Value Date    PTH 69 (H) 03/31/2023    CALCIUM 9.6 06/12/2025    PHOS 3.6 05/15/2024      Lab Results   Component Value Date    SPEP  03/31/2023     SEE REFERENCE LAB REPORT  Performed at [address] [id number]        Lab Results   Component Value Date    ALBUR 12 03/31/2023      Assessment/Plan    Hx of grave's disease diagnosed in 1984   Here for concerns that she might have RA.     Reports mechanical polyarthralgias mostly of PIPs, also reports episodes of triggering that resolve with CSI. Doesn't report inflammatory component to pain or any episodes of clinical gout or acute crystal like arthritis episodes.     She has a positive DENISE and known RP since the 1990's, no digital ulcers.     Labs 03, 07 and 11/2023:  DENISE + but AURELIA neg   Complements normal   ACPA and RF neg   CRP and ESR normal   ANCA neg     XR hands 01/2024 at [facility] report with moderate OA of R " 1st IP, CMC, and triscaphe and bilateral IP joints.     There is low concern for inflammatory arthritis based on evaluation and labs and XR and likely to be all OA. She was counseled on OA, tylenol up to 3g daily, topical voltaren gel. Since she has seen multiple rheumatologists for the same complaint will obtain MSK US hands and wrists and updated XR for completion of work up and for thoroughness.   She will continue to follow up with ortho for flexor stenosing tenosynovitis episodes.     RTC in 1 month for discussion of results     Sherri Fay MD  Division of Rheumatology   [facility]          [1]   Patient Active Problem List  Diagnosis    Essential hypertension    Morbid obesity (Multi)    Overactive bladder    Nonproliferative diabetic retinopathy    Type 2 diabetes mellitus with unspecified diabetic retinopathy with macular edema    Nuclear sclerosis    Numbness    Osteoarthritis    Type 2 diabetes mellitus with stage 3a chronic kidney disease, without long-term current use of insulin (Multi)    Iron deficiency anemia   [2]   Past Medical History:  Diagnosis Date    Age-related nuclear cataract, bilateral 10/04/2019    Nuclear sclerosis of both eyes    Allergic 1990    Anemia 2000    Anxiety 1990    Arthritis 2000    Asthma 1990    Chronic kidney disease 2023    Depression 1990    Diabetes mellitus (Multi) 1984    Eczema 2023    GERD (gastroesophageal reflux disease) 1990    Personal history of irradiation 07/28/2017    S/P radioactive iodine thyroid ablation    Thyrotoxicosis, unspecified without thyrotoxic crisis or storm 10/27/2017    Hyperthyroidism    Trigger finger [id number]   [3]   Past Surgical History:  Procedure Laterality Date    CARPAL TUNNEL RELEASE  01/2016    EYE SURGERY  2016    OTHER SURGICAL HISTORY  06/08/2020    Eye surgery   [4]   Allergies  Allergen Reactions    Zclhiky-Gtlicrdqw-Aeaexsciikiq Palpitations     Night quil    Buspirone Hcl Other     Rapid heart rate     "Doxylamin-Pse-Dm-Acetaminophen Other     Rapid heart rate    House Dust Other     wheezing    Bupropion Palpitations     Other reaction(s): Chest pain   Palpitations   [5]   Current Outpatient Medications:     albuterol (Ventolin HFA) 90 mcg/actuation inhaler, Inhale 2 puffs every 4 hours if needed for wheezing., Disp: 18 g, Rfl: 0    BD Ultra-Fine Diana Pen Needle 32 gauge x 5/32\" needle, Use one pen needle daily with insulin injection, Disp: 100 each, Rfl: 3    blood sugar diagnostic (Blood Glucose Test), Check fasting blood sugar once daily., Disp: 100 each, Rfl: 1    cholecalciferol (Vitamin D-3) 25 mcg (1,000 units) tablet, Take 1 tablet (25 mcg) by mouth once daily., Disp: 30 tablet, Rfl: 11    ciclopirox (Penlac) 8 % solution, Apply to affected toenails daily, once per week remove with polish remover or nail file and repeat for 6 months, Disp: , Rfl:     famotidine (Pepcid) 40 mg tablet, Take 1 tablet (40 mg) by mouth once daily., Disp: , Rfl:     furosemide (Lasix) 40 mg tablet, Take 0.5 tablets (20 mg) by mouth every other day., Disp: , Rfl:     glucagon (Gvoke HypoPen 2-Pack) 1 mg/0.2 mL auto-injector, Inject the contents of one pen under the skin if needed for low glucose emergency, Disp: 0.4 mL, Rfl: 11    insulin glargine (Lantus U-100 Insulin) 100 unit/mL injection, Inject 10 Units under the skin once daily at bedtime., Disp: , Rfl:     Jardiance 25 mg tablet, Take 1 tablet (25 mg) by mouth once daily in the morning. Take before meals., Disp: 90 tablet, Rfl: 0    lancets misc, Check fasting blood sugar once daily., Disp: 100 each, Rfl: 1    levothyroxine (Synthroid, Levoxyl) 75 mcg tablet, Take 1 tablet (75 mcg) by mouth once daily., Disp: 90 tablet, Rfl: 0    lisinopril 2.5 mg tablet, Take 1 tablet (2.5 mg) by mouth once daily., Disp: , Rfl:     metFORMIN  mg 24 hr tablet, TAKE 2 TABLETS BY MOUTH TWICE DAILY, Disp: 120 tablet, Rfl: 0    montelukast (Singulair) 10 mg tablet, Take 1 tablet (10 mg) " by mouth once daily., Disp: , Rfl:     oxybutynin XL (Ditropan-XL) 10 mg 24 hr tablet, Take 1 tablet (10 mg) by mouth once daily., Disp: , Rfl:     rosuvastatin (Crestor) 20 mg tablet, Take 1 tablet (20 mg) by mouth once daily., Disp: 90 tablet, Rfl: 0    SITagliptin phosphate (Januvia) 100 mg tablet, Take 1 tablet (100 mg) by mouth once daily., Disp: 30 tablet, Rfl: 11

## 2025-06-27 ENCOUNTER — APPOINTMENT (OUTPATIENT)
Dept: GASTROENTEROLOGY | Facility: CLINIC | Age: 70
End: 2025-06-27
Payer: COMMERCIAL

## 2025-06-27 VITALS
BODY MASS INDEX: 40.18 KG/M2 | HEIGHT: 66 IN | SYSTOLIC BLOOD PRESSURE: 109 MMHG | DIASTOLIC BLOOD PRESSURE: 67 MMHG | WEIGHT: 250 LBS | OXYGEN SATURATION: 100 % | RESPIRATION RATE: 18 BRPM | HEART RATE: 54 BPM

## 2025-06-27 DIAGNOSIS — R13.10 DYSPHAGIA, UNSPECIFIED TYPE: ICD-10-CM

## 2025-06-27 DIAGNOSIS — K58.2 IRRITABLE BOWEL SYNDROME WITH BOTH CONSTIPATION AND DIARRHEA: Primary | ICD-10-CM

## 2025-06-27 PROCEDURE — 1036F TOBACCO NON-USER: CPT

## 2025-06-27 PROCEDURE — 1159F MED LIST DOCD IN RCRD: CPT

## 2025-06-27 PROCEDURE — 1126F AMNT PAIN NOTED NONE PRSNT: CPT

## 2025-06-27 PROCEDURE — 3044F HG A1C LEVEL LT 7.0%: CPT

## 2025-06-27 PROCEDURE — 1160F RVW MEDS BY RX/DR IN RCRD: CPT

## 2025-06-27 PROCEDURE — 99204 OFFICE O/P NEW MOD 45 MIN: CPT

## 2025-06-27 PROCEDURE — 3078F DIAST BP <80 MM HG: CPT

## 2025-06-27 PROCEDURE — 3008F BODY MASS INDEX DOCD: CPT

## 2025-06-27 PROCEDURE — 4010F ACE/ARB THERAPY RXD/TAKEN: CPT

## 2025-06-27 PROCEDURE — 3074F SYST BP LT 130 MM HG: CPT

## 2025-06-27 RX ORDER — PSYLLIUM HUSK 0.4 G
3-6 CAPSULE ORAL DAILY
Qty: 180 CAPSULE | Refills: 11 | Status: SHIPPED | OUTPATIENT
Start: 2025-06-27 | End: 2026-06-27

## 2025-06-27 SDOH — ECONOMIC STABILITY: FOOD INSECURITY: WITHIN THE PAST 12 MONTHS, YOU WORRIED THAT YOUR FOOD WOULD RUN OUT BEFORE YOU GOT MONEY TO BUY MORE.: NEVER TRUE

## 2025-06-27 SDOH — ECONOMIC STABILITY: FOOD INSECURITY: WITHIN THE PAST 12 MONTHS, THE FOOD YOU BOUGHT JUST DIDN'T LAST AND YOU DIDN'T HAVE MONEY TO GET MORE.: NEVER TRUE

## 2025-06-27 ASSESSMENT — COLUMBIA-SUICIDE SEVERITY RATING SCALE - C-SSRS
2. HAVE YOU ACTUALLY HAD ANY THOUGHTS OF KILLING YOURSELF?: NO
1. IN THE PAST MONTH, HAVE YOU WISHED YOU WERE DEAD OR WISHED YOU COULD GO TO SLEEP AND NOT WAKE UP?: NO
6. HAVE YOU EVER DONE ANYTHING, STARTED TO DO ANYTHING, OR PREPARED TO DO ANYTHING TO END YOUR LIFE?: NO

## 2025-06-27 ASSESSMENT — ENCOUNTER SYMPTOMS
DIARRHEA: 1
ABDOMINAL DISTENTION: 1
ANAL BLEEDING: 0
TROUBLE SWALLOWING: 1
BLOOD IN STOOL: 0
CONSTIPATION: 1
ABDOMINAL PAIN: 1
ROS GI COMMENTS: SEE HPI

## 2025-06-27 ASSESSMENT — PAIN SCALES - GENERAL: PAINLEVEL_OUTOF10: 0-NO PAIN

## 2025-06-27 ASSESSMENT — PATIENT HEALTH QUESTIONNAIRE - PHQ9
2. FEELING DOWN, DEPRESSED OR HOPELESS: SEVERAL DAYS
1. LITTLE INTEREST OR PLEASURE IN DOING THINGS: SEVERAL DAYS
SUM OF ALL RESPONSES TO PHQ9 QUESTIONS 1 AND 2: 2

## 2025-06-27 NOTE — PATIENT INSTRUCTIONS
Try to avoid trigger foods for your IBS  Esophagram to look at swallowing    Add the fiber supplement psyllium (brands- Metamucil,  Konsyl, [employer], Codewise, store brand) daily. Start with 1 capsule daily for 1 week, then increase to 2 capsules daily the next week, then increase to 3 capsules daily.  Continue to increase as tolerated, take 3 to 6/day. This helps bulk stool for more complete bowel movements.     IBS  Irritable bowel syndrome (IBS) is a common disorder that affects the GI tract. Signs and symptoms include cramping, abdominal pain, bloating, gas, diarrhea/constipation, or both. People with IBS may have a change in how their bowel movements look. IBS is a chronic condition, but symptoms can be controlled by managing diet, lifestyle and stress. IBS does not cause changes in the tissue of the intestine or increase your risk of colon cancer. IBS is believed to be caused by changes in the nerves and muscles that control gut sensation and movement. IBS can be worsened by different triggers, stress, and anxiety. IBS is a real medical condition, but it is not life threatening and will not lead to other serious diseases.    Ways to help IBS symptoms:   -Eliminate all lactose containing products for 4 weeks to see if your symptoms improve    -Some people with IBS symptoms improve if they stop eating gluten (wheat, barley and rye) even if they do not have celiac disease.    -Some people are sensitive to certain carbohydrates such as fructans, lactose and others known as FODMAPs (fermentable oligosaccharide, disaccharide, monosaccharide and polyols) which are found in certain grains, vegetables, fruits and dairy products. Your symptoms might get better if you follow a low FODMAP diet and then reintroduce foods one at a time. www.fodmapeveryday.com is a great web site with instructions on how to do the low FODMAP diet. It also has recipes and lifestyle tips that help IBS symptoms.     There are multiple  supplements available that may be helpful for IBS symptoms  For cramping and bloating, try enteric coated Peppermint Oil Capsules 150-200 mg up to 3 times daily- they can have a side effect of heartburn. If you are prone to heartburn, take the peppermint oil at least 1 hour before eating, on an empty stomach. NOW foods is a good brand. The store brand works great too.   Rachel's Ailin tamers is a brand patients with IBS will use, you only need one softgel up to 3 times daily as needed.   FDgard® is a dietary supplement, clinically shown to help manage a combination of symptoms of abdominal discomfort like cramping, pain, inability to finish a normal-sized meal, heaviness, pressure, nausea, bloating and belching. FDgard® possesses anti-inflammatory, analgesic (pain relieving), and gastro-protective properties.  It is available over the counter and online. You can take two capsules daily 30-60 mins before a meal and can repeat as needed. The max dose is 6 capsules per day.  Iberogast supports gut function, the gut microbiome, and the gut-brain interaction.  It comes as liquid drops which you put into a beverage or softgels.  For best results, it is recommended to take 3 times per day before or during meals.    -Exercise regularly, walking is a fast, free, and easy way to improve abdominal pain in IBS.  Some people feel better with relaxation techniques and regular exercise or having a hobby. Meeting with a counselor can also help.    -Take time to yourself every day for self care or a hobby that you love. 10 minutes of alone time can go a long way and is less than 1 percent of your day! Taking care of yourself is not selfish!    -Schedule a follow up appointment in 2-3 months.    -Please call the office at [phone] with any questions or concerns.

## 2025-07-24 ENCOUNTER — HOSPITAL ENCOUNTER (OUTPATIENT)
Dept: RADIOLOGY | Facility: HOSPITAL | Age: 70
Discharge: HOME | End: 2025-07-24
Payer: COMMERCIAL

## 2025-07-24 DIAGNOSIS — R13.10 DYSPHAGIA, UNSPECIFIED TYPE: ICD-10-CM

## 2025-07-24 PROCEDURE — 74221 X-RAY XM ESOPHAGUS 2CNTRST: CPT | Performed by: RADIOLOGY

## 2025-07-24 PROCEDURE — A9698 NON-RAD CONTRAST MATERIALNOC: HCPCS

## 2025-07-24 PROCEDURE — 74220 X-RAY XM ESOPHAGUS 1CNTRST: CPT

## 2025-07-24 PROCEDURE — 2500000005 HC RX 250 GENERAL PHARMACY W/O HCPCS

## 2025-07-24 PROCEDURE — 2500000001 HC RX 250 WO HCPCS SELF ADMINISTERED DRUGS (ALT 637 FOR MEDICARE OP)

## 2025-07-24 RX ADMIN — BARIUM SULFATE 100 ML: 980 POWDER, FOR SUSPENSION ORAL at 10:41

## 2025-07-24 RX ADMIN — BARIUM SULFATE 355 ML: 0.6 SUSPENSION ORAL at 10:40

## 2025-07-24 RX ADMIN — ANTACID/ANTIFLATULENT 1 PACKET: 380; 550; 10; 10 GRANULE, EFFERVESCENT ORAL at 10:40

## 2025-07-25 ENCOUNTER — HOSPITAL ENCOUNTER (OUTPATIENT)
Dept: RADIOLOGY | Facility: HOSPITAL | Age: 70
Discharge: HOME | End: 2025-07-25
Payer: COMMERCIAL

## 2025-07-25 DIAGNOSIS — M25.541 JOINT PAIN IN BOTH HANDS: ICD-10-CM

## 2025-07-25 DIAGNOSIS — M25.542 JOINT PAIN IN BOTH HANDS: ICD-10-CM

## 2025-07-25 PROCEDURE — 76881 US COMPL JOINT R-T W/IMG: CPT

## 2025-07-30 PROBLEM — D64.9 ANEMIA: Status: ACTIVE | Noted: 2019-11-17

## 2025-07-30 PROBLEM — R32 URINARY INCONTINENCE: Status: ACTIVE | Noted: 2022-11-18

## 2025-07-30 PROBLEM — M54.2 CERVICAL SPINE PAIN: Status: ACTIVE | Noted: 2025-07-30

## 2025-07-30 PROBLEM — E05.90 HYPERTHYROIDISM: Status: ACTIVE | Noted: 2025-07-30

## 2025-07-30 PROBLEM — R60.0 PEDAL EDEMA: Status: ACTIVE | Noted: 2020-12-02

## 2025-07-30 PROBLEM — R26.89 BALANCE PROBLEM: Status: ACTIVE | Noted: 2023-02-02

## 2025-07-30 PROBLEM — H43.812 PVD (POSTERIOR VITREOUS DETACHMENT), LEFT EYE: Status: ACTIVE | Noted: 2025-07-30

## 2025-07-30 PROBLEM — M25.572 LEFT ANKLE PAIN: Status: ACTIVE | Noted: 2021-07-09

## 2025-07-30 PROBLEM — N18.30 STAGE 3 CHRONIC KIDNEY DISEASE (MULTI): Status: ACTIVE | Noted: 2023-03-31

## 2025-07-30 PROBLEM — I87.9 DISORDER OF VEIN OF LOWER EXTREMITY: Status: ACTIVE | Noted: 2023-07-22

## 2025-07-30 PROBLEM — M65.30 TRIGGER FINGER: Status: ACTIVE | Noted: 2021-05-30

## 2025-07-30 PROBLEM — R76.8 POSITIVE ANTINUCLEAR ANTIBODY: Status: ACTIVE | Noted: 2023-09-22

## 2025-07-30 PROBLEM — H57.9 ITCHY EYES: Status: ACTIVE | Noted: 2020-06-08

## 2025-07-30 PROBLEM — R23.3 EASY BRUISING: Status: ACTIVE | Noted: 2019-10-23

## 2025-07-30 PROBLEM — R60.0 PERIPHERAL EDEMA: Status: ACTIVE | Noted: 2021-01-31

## 2025-07-30 PROBLEM — M23.52 CHRONIC INSTABILITY OF LEFT KNEE: Status: ACTIVE | Noted: 2023-02-02

## 2025-07-30 PROBLEM — M41.80 LEVOSCOLIOSIS: Status: ACTIVE | Noted: 2023-02-01

## 2025-07-30 PROBLEM — Z86.39 HISTORY OF GRAVES' DISEASE: Status: ACTIVE | Noted: 2022-11-18

## 2025-07-30 PROBLEM — M48.00 SPINAL STENOSIS: Status: ACTIVE | Noted: 2025-07-30

## 2025-07-30 PROBLEM — N39.0 ACUTE UTI: Status: ACTIVE | Noted: 2020-03-24

## 2025-07-30 PROBLEM — R21 RASH: Status: ACTIVE | Noted: 2019-10-23

## 2025-07-30 PROBLEM — Z97.3 WEARS GLASSES: Status: ACTIVE | Noted: 2025-07-30

## 2025-07-30 PROBLEM — H10.13 ACUTE ALLERGIC CONJUNCTIVITIS OF BOTH EYES: Status: ACTIVE | Noted: 2025-07-30

## 2025-07-30 PROBLEM — M79.89 SWELLING OF BOTH LOWER EXTREMITIES: Status: ACTIVE | Noted: 2023-02-15

## 2025-07-30 PROBLEM — J45.20 MILD INTERMITTENT ASTHMA: Status: ACTIVE | Noted: 2022-11-18

## 2025-07-30 PROBLEM — J45.998 SEASONAL ASTHMA (HHS-HCC): Status: ACTIVE | Noted: 2025-07-30

## 2025-07-30 PROBLEM — F41.1 GENERALIZED ANXIETY DISORDER: Status: ACTIVE | Noted: 2022-11-18

## 2025-07-30 PROBLEM — M79.602 PAIN OF LEFT UPPER EXTREMITY: Status: ACTIVE | Noted: 2025-07-30

## 2025-07-30 PROBLEM — E55.9 VITAMIN D DEFICIENCY: Status: ACTIVE | Noted: 2020-09-08

## 2025-07-30 PROBLEM — L03.116 LEFT LEG CELLULITIS: Status: ACTIVE | Noted: 2021-05-04

## 2025-07-30 PROBLEM — Z86.39 HISTORY OF ELEVATED LIPIDS: Status: ACTIVE | Noted: 2022-11-18

## 2025-07-30 PROBLEM — M50.10 CERVICAL DISC DISORDER WITH RADICULOPATHY: Status: ACTIVE | Noted: 2025-07-30

## 2025-07-30 PROBLEM — G56.00 CARPAL TUNNEL SYNDROME: Status: ACTIVE | Noted: 2025-07-30

## 2025-07-30 PROBLEM — H52.7 REFRACTIVE ERROR: Status: ACTIVE | Noted: 2025-07-30

## 2025-07-30 PROBLEM — M79.641 RIGHT HAND PAIN: Status: ACTIVE | Noted: 2020-12-02

## 2025-07-30 PROBLEM — N89.8 VAGINAL DISCHARGE: Status: ACTIVE | Noted: 2023-03-17
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
## 2025-07-30 PROBLEM — H26.9 CATARACTS, BOTH EYES: Status: ACTIVE | Noted: 2025-07-30

## 2025-07-30 PROBLEM — M17.12 ARTHRITIS OF LEFT KNEE: Status: ACTIVE | Noted: 2023-03-17

## 2025-07-30 PROBLEM — S92.353A FRACTURE OF 5TH METATARSAL: Status: ACTIVE | Noted: 2021-07-12

## 2025-07-30 PROBLEM — H10.10 ALLERGIC CONJUNCTIVITIS: Status: ACTIVE | Noted: 2025-07-30

## 2025-07-30 PROBLEM — M25.50 POLYARTHRALGIA: Status: ACTIVE | Noted: 2022-03-18

## 2025-07-30 PROBLEM — E11.39: Status: ACTIVE | Noted: 2025-07-30

## 2025-07-30 PROBLEM — Z86.19 HISTORY OF INFECTIOUS DISEASE: Status: ACTIVE | Noted: 2025-07-30

## 2025-07-30 PROBLEM — E78.5 HYPERLIPIDEMIA, UNSPECIFIED: Status: ACTIVE | Noted: 2019-10-28

## 2025-07-30 PROBLEM — M54.9 BACK PAIN: Status: ACTIVE | Noted: 2020-03-18

## 2025-07-30 PROBLEM — R14.3 FLATULENCE: Status: ACTIVE | Noted: 2023-05-17

## 2025-07-30 PROBLEM — L84 CALLUS OF FOOT: Status: ACTIVE | Noted: 2023-05-17

## 2025-07-30 PROBLEM — R05.9 COUGH: Status: ACTIVE | Noted: 2021-08-31

## 2025-07-30 PROBLEM — F32.A DEPRESSIVE DISORDER: Status: ACTIVE | Noted: 2023-02-20

## 2025-07-30 PROBLEM — K58.9 IRRITABLE BOWEL SYNDROME: Status: ACTIVE | Noted: 2021-05-30

## 2025-08-01 ENCOUNTER — HOSPITAL ENCOUNTER (OUTPATIENT)
Dept: RADIOLOGY | Facility: HOSPITAL | Age: 70
Discharge: HOME | End: 2025-08-01
Payer: COMMERCIAL

## 2025-08-01 ENCOUNTER — APPOINTMENT (OUTPATIENT)
Facility: CLINIC | Age: 70
End: 2025-08-01
Payer: COMMERCIAL

## 2025-08-01 VITALS
DIASTOLIC BLOOD PRESSURE: 78 MMHG | HEART RATE: 64 BPM | BODY MASS INDEX: 41.79 KG/M2 | WEIGHT: 255 LBS | SYSTOLIC BLOOD PRESSURE: 142 MMHG | OXYGEN SATURATION: 99 %

## 2025-08-01 DIAGNOSIS — M25.542 JOINT PAIN IN BOTH HANDS: ICD-10-CM

## 2025-08-01 DIAGNOSIS — M25.441 EFFUSION OF JOINT OF RIGHT HAND: ICD-10-CM

## 2025-08-01 DIAGNOSIS — M25.541 JOINT PAIN IN BOTH HANDS: ICD-10-CM

## 2025-08-01 DIAGNOSIS — M25.541 JOINT PAIN IN BOTH HANDS: Primary | ICD-10-CM

## 2025-08-01 DIAGNOSIS — M25.542 JOINT PAIN IN BOTH HANDS: Primary | ICD-10-CM

## 2025-08-01 PROCEDURE — 73130 X-RAY EXAM OF HAND: CPT | Mod: 50

## 2025-08-01 PROCEDURE — 4010F ACE/ARB THERAPY RXD/TAKEN: CPT | Performed by: INTERNAL MEDICINE

## 2025-08-01 PROCEDURE — 3044F HG A1C LEVEL LT 7.0%: CPT | Performed by: INTERNAL MEDICINE

## 2025-08-01 PROCEDURE — 1036F TOBACCO NON-USER: CPT | Performed by: INTERNAL MEDICINE

## 2025-08-01 PROCEDURE — 3077F SYST BP >= 140 MM HG: CPT | Performed by: INTERNAL MEDICINE

## 2025-08-01 PROCEDURE — 73110 X-RAY EXAM OF WRIST: CPT | Mod: 50

## 2025-08-01 PROCEDURE — 3078F DIAST BP <80 MM HG: CPT | Performed by: INTERNAL MEDICINE

## 2025-08-01 PROCEDURE — 1159F MED LIST DOCD IN RCRD: CPT | Performed by: INTERNAL MEDICINE

## 2025-08-01 PROCEDURE — 99215 OFFICE O/P EST HI 40 MIN: CPT | Performed by: INTERNAL MEDICINE

## 2025-08-01 ASSESSMENT — ENCOUNTER SYMPTOMS
DEPRESSION: 0
LOSS OF SENSATION IN FEET: 0
OCCASIONAL FEELINGS OF UNSTEADINESS: 0

## 2025-08-01 ASSESSMENT — PATIENT HEALTH QUESTIONNAIRE - PHQ9
2. FEELING DOWN, DEPRESSED OR HOPELESS: NOT AT ALL
1. LITTLE INTEREST OR PLEASURE IN DOING THINGS: NOT AT ALL
SUM OF ALL RESPONSES TO PHQ9 QUESTIONS 1 AND 2: 0

## 2025-08-01 NOTE — PROGRESS NOTES
Subjective   Patient ID: [id number]   Kayli Senior is a 69 y.o. female who presents for Follow-up.      HPI  PMH/PSH: T2DM, DM retinopathy, HLD, asthma, GERD, hypothyroidism, IBS, CKD, Hx of Grave's disease, b/l carpal tunnel release    Social: Nonsmoker, no alcohol, no drug use. 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   FHx: No family history of autoimmune diseases     Recall:   Initially found to have positive DENISE in 2016. Saw Dr. Hsieh at  and was thought to be false positive. Followed up with her again in 2022. She later saw Dr. Barrow who did not think she had evidence of an inflammatory arthritis or CTD.     First visit 6/25 vv:   She had carpal tunnel surgery done b/l 2 years apart, and every time the surgeon would ask if she has rheumatoid arthritis in both procedures. She is worried that her finger on the L hand is deformed. 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   She also reports that when she was working with cold items her hands would flare and hurt. 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 Prior to that she used to do .     Most of her pain is in her fingers, Ips? palm of her hands at site of A1 pulleys, L>R, worse in the winter, and significantly worse with overuse, she reports having trigger fingers and trigger thumbs and had CSI for them. No pain in MCPs or DIPs. Reports bony enlargement of L 3rd PIP.   Cannot hold her phone for too long. Uses arthritis gloves.   No AM stiffness in the summer but reports some stiffness in the winter.   Has received a L CSI for her knee    Reports that since it's warmer her polyarthralgias is better, she feels her b/l 3rd PIP is enlarged, she gets  triggering but isn't happening as often with her R 1st digit and L 3rd digit.   Reports not much pain in her MCPs. No pain in R first digit.   If she accidentally bumps her hand on a hard surface it's really painful.   She said it intermittently hurts in PIPs when she Is trying to make a fist     ROS  Denies fever, chills, weight loss, night sweats. No fatigue, feels refreshed in AM. + dry eyes. No ocular inflx. + dry mouth, no dental loss, loss of taste, nasal or oral ulcers, difficulty swallowing with dry crackers. + chest pain and SOB on exertion is improving and was going to get a stress test but she canceled it, cough, asthma, or recurrent respiratory infections. Denies  nausea, vomiting, abdominal pain, + constipation alternating with diarrhea. No melena or hematochezia. No recurrent urinary infections or STDs, no genital or anal ulcers. Reports venous stasis changes. + Raynaud's phenomenon, no digital ulcers, psoriatic lesions. Denies any numbness or tingling. Denies history of clots (arterial or venous), or miscarriages.    Problem List[1]     Medical History[2]     Surgical History[3]     Social History     Socioeconomic History    Marital status:      Spouse name: Not on file    Number of children: Not on file    Years of education: Not on file    Highest education level: Not on file   Occupational History    Not on file   Tobacco Use    Smoking status: Never     Passive exposure: Never    Smokeless tobacco: Never   Vaping Use    Vaping status: Never Used   Substance and Sexual Activity    Alcohol use: Never    Drug use: Never    Sexual activity: Not Currently     Partners: Male     Birth control/protection: Abstinence   Other Topics Concern    Not on file   Social History Narrative    Not on file     Social Drivers of Health     Financial Resource Strain: Not on File (2/24/2023)    Received from [employer]    Financial Resource Strain     Financial Resource Strain: 0   Food Insecurity: No Food  Insecurity (2025)    Hunger Vital Sign     Worried About Running Out of Food in the Last Year: Never true     Ran Out of Food in the Last Year: Never true   Transportation Needs: High Risk (2024)    Received from [facility] SDOH Screening     Has lack of transportation kept you from medical appointments, meetings, work or from getting things needed for daily living? choose all that apply.: Yes, it has kept me from medical appointments or from getting my medications     Has lack of transportation kept you from medical appointments, meetings, work or from getting things needed for daily living? choose all that apply.: Yes, it has kept me from non-medical meetings, appointments, work or from getting things that i need   Physical Activity: Not on File (2021)    Received from [employer]    Physical Activity     Physical Activity: 0   Stress: Not on File (2023)    Received from [employer]    Stress     Stress: 0   Recent Concern: Stress - At Risk (2023)    Received from [employer]    Stress     Stress: 2   Social Connections: Not on File (2023)    Received from [employer]    Social Connections     Connectedness: 0   Intimate Partner Violence: Not on file   Housing Stability: Not on File (2023)    Received from [employer]    Housing Stability     Housin        RX Allergies[4]     Current Medications[5]       Objective     Visit Vitals  /78 (BP Location: Right arm, Patient Position: Sitting)   Pulse 64      Physical Exam  General: AAOx3, Cooperative  Head: normocephalic, atraumatic  Eyes: EOMI, conjunctiva clear, sclera white, anicteric  Throat/Mouth: No oral deformities, no cheek swelling, mucosa appear dry  Skin: No rashes, ulcers or photosensitive areas  MSK: Upper Extremities:  Hand/Fingers: Swelling of 3rd MCP bilaterally with ttp b/l R>L No erythema, or warmth, has bony hypertophy of multiple digits.   Wrists: No erythema, swelling, warmth or tenderness at wrist, FROM grossly  Elbows:  "No tenderness, swelling, erythema or warmth at elbows, FROM grossly. No nodules   Shoulders: . FROM  Lower Extremities:   Hips: No obvious deformities.   Knees: No tenderness, deformities, swelling, rashes, or warmth, + crepitus     Lab Results   Component Value Date    WBC 7.0 06/12/2025    HGB 10.8 (L) 06/12/2025    HCT 35.2 06/12/2025    MCV 90.3 06/12/2025     06/12/2025        Chemistry    Lab Results   Component Value Date/Time     06/12/2025 1140    K 4.4 06/12/2025 1140     06/12/2025 1140    CO2 27 06/12/2025 1140    BUN 21 06/12/2025 1140    CREATININE 1.20 (H) 06/12/2025 1140    Lab Results   Component Value Date/Time    CALCIUM 9.6 06/12/2025 1140    ALKPHOS 113 06/12/2025 1140    AST 25 06/12/2025 1140    ALT 20 06/12/2025 1140    BILITOT 0.3 06/12/2025 1140           Lab Results   Component Value Date    CRP 0.18 04/18/2022      Lab Results   Component Value Date    DENISE (A) 03/31/2023     POSITIVE  Reference range: NEGATIVE    Anti-nuclear antibody test is used as an aid in diagnosis of systemic  autoimmune diseases. Where positive and clinically warranted,  follow-up using disease-specific testing is recommended. Low positive  titers are not uncommon with advanced age, certain chronic  infections, and malignancies among others.     Test methodology: Indirect fluorescence immunoassay (IFA) using HEp-2  cells.      RF 14 04/18/2022      No results found for: \"CKTOTAL\"  Lab Results   Component Value Date    NEUTROABS 3.31 04/18/2022      No results found for: \"FERRITIN\"   No results found for: \"HEPATOT\", \"HEPAIGM\", \"HEPBCIGM\", \"HEPBCAB\", \"HBEAG\", \"HEPCAB\"   Lab Results   Component Value Date    ALT 20 06/12/2025    AST 25 06/12/2025    ALKPHOS 113 06/12/2025    BILITOT 0.3 06/12/2025      No results found for: \"PPD\"   No results found for: \"URICACID\"   Lab Results   Component Value Date    PTH 69 (H) 03/31/2023    CALCIUM 9.6 06/12/2025    PHOS 3.6 05/15/2024      Lab Results "   Component Value Date    SPEP  03/31/2023     SEE REFERENCE LAB REPORT  Performed at [facility] [id number] [place] [address] Padmini Count includes the [facility] [id number]        Lab Results   Component Value Date    KOLTON 12 03/31/2023      Assessment/Plan    Hx of grave's disease diagnosed in 1984   Here for concerns that she might have RA.     Reports mechanical polyarthralgias mostly of PIPs, also reports episodes of triggering that resolve with CSI. Doesn't report inflammatory component to pain or any episodes of clinical gout or acute crystal like arthritis episodes.   She has a positive DENISE and known RP since the 1990's, no digital ulcers.     Labs 03, 07 and 11/2023:  DENISE + but AURELIA neg   Complements normal   ACPA and RF neg   CRP and ESR normal   ANCA neg     She had an MSK US 7/25 reviewed:  trace volume effusion of the R 1st IP joint but no increased color signal intensity or synovial proliferation is evident. There is osteophyte formation at  this joint. There is a trace joint effusion of the 1st MCP of the right hand without synovial proliferation or increased color signal intensity evident. There is a trace volume effusion of the 3rd MCP of  the right hand without increased color signal intensity or synovial  proliferation evident.  The joints of the left hand as visualized are without significant degenerative change increased color signal intensity synovial proliferation or joint  effusion.    There was no evidence of synovial proliferation or active inflammation? But there was an effusion of the R 3rd MCP, she has significant ttp to the MCPs b/l today with swelling. She also has bony hypertrophy of multiple Ips.   She had a physical job previously and can see MCP OA with that additionally. she was working in fast food, worked in dishwashing, and food prep and manufacturing prior to that. Discussed that effusion can be seen with OA but can also be evolving or early seronegative RA? Will obtain XR today, inflammatory markers and discussed  trial of short course of prednisone to assess percentage of response. IF significnt response will consider methotrexate, would avoid HCQ, reports diabetic retinopathy     She will continue to follow up with ortho for flexor stenosing tenosynovitis episodes.     RTC in 2 weeks to discuss results/response     Sherri Fay MD  Division of Rheumatology   [facility]            [1]   Patient Active Problem List  Diagnosis    Essential hypertension    Morbid obesity (Multi)    Overactive bladder    Nonproliferative diabetic retinopathy    Type 2 diabetes mellitus with unspecified diabetic retinopathy with macular edema    Nuclear sclerosis    Numbness    Osteoarthritis    Type 2 diabetes mellitus with stage 3a chronic kidney disease, without long-term current use of insulin (Multi)    Iron deficiency anemia    Acute allergic conjunctivitis of both eyes    Acute UTI    Allergic conjunctivitis    Anemia    Arthritis of left knee    Back pain    Balance problem    Callus of foot    Carpal tunnel syndrome    Cataracts, both eyes    Cervical disc disorder with radiculopathy    Cervical spine pain    Chronic instability of left knee    Cough    Depressive disorder    Diabetic ophthalmopathy (Multi)    Disorder of vein of lower extremity    Easy bruising    Fall    Flatulence    Fracture of 5th metatarsal    Gastroesophageal reflux disease without esophagitis    Generalized anxiety disorder    History of elevated lipids    History of Graves' disease    History of infectious disease    Hyperlipidemia, unspecified    Hyperthyroidism    Irritable bowel syndrome    Itchy eyes    Lateral epicondylitis of elbow    Left ankle pain    Left leg cellulitis    Levoscoliosis    Medication adverse effect    Mild intermittent asthma    Pain of left upper extremity    Pedal edema    Peripheral edema    Polyarthralgia    Positive antinuclear antibody    Postablative hypothyroidism    PVD (posterior vitreous detachment), left eye     "Rash    Refractive error    Retinal disorder    Right hand pain    Seasonal asthma ([facility])    Spinal stenosis    Stage 3 chronic kidney disease (Multi)    Swelling of both lower extremities    Trigger finger    Urge incontinence of urine    Urinary incontinence    Vaginal discharge    Vitamin D deficiency    Wears glasses   [2]   Past Medical History:  Diagnosis Date    Age-related nuclear cataract, bilateral 10/04/2019    Nuclear sclerosis of both eyes    Allergic 1990    Anemia 2000    Anxiety 1990    Arthritis 2000    Asthma 1990    Chronic kidney disease 2023    Depression 1990    Diabetes mellitus (Multi) 1984    Eczema 2023    GERD (gastroesophageal reflux disease) 1990    Personal history of irradiation 07/28/2017    S/P radioactive iodine thyroid ablation    Thyrotoxicosis, unspecified without thyrotoxic crisis or storm 10/27/2017    Hyperthyroidism    Trigger finger [id number]   [3]   Past Surgical History:  Procedure Laterality Date    CARPAL TUNNEL RELEASE  01/2016    EYE SURGERY  2016    OTHER SURGICAL HISTORY  06/08/2020    Eye surgery   [4]   Allergies  Allergen Reactions    Eczlnds-Oatofhgkm-Iumtqnjsqsjh Palpitations     Night quil    Mold Shortness of breath    Cigarette Smoke Agitation    Doxylamin-Pse-Dm-Acetaminophen Other     Rapid heart rate    House Dust Other     wheezing    Nickel Unknown    Bupropion Palpitations     Other reaction(s): Chest pain   Palpitations   [5]   Current Outpatient Medications:     albuterol (Ventolin HFA) 90 mcg/actuation inhaler, Inhale 2 puffs every 4 hours if needed for wheezing., Disp: 18 g, Rfl: 0    BD Ultra-Fine Diana Pen Needle 32 gauge x 5/32\" needle, Use one pen needle daily with insulin injection, Disp: 100 each, Rfl: 3    blood sugar diagnostic (Blood Glucose Test), Check fasting blood sugar once daily., Disp: 100 each, Rfl: 1    cholecalciferol (Vitamin D-3) 25 mcg (1,000 units) tablet, Take 1 tablet (25 mcg) by mouth once daily., Disp: 30 tablet, Rfl: 11    " famotidine (Pepcid) 40 mg tablet, Take 1 tablet (40 mg) by mouth once daily., Disp: , Rfl:     furosemide (Lasix) 40 mg tablet, Take 0.5 tablets (20 mg) by mouth every other day., Disp: , Rfl:     glucagon (Gvoke HypoPen 2-Pack) 1 mg/0.2 mL auto-injector, Inject the contents of one pen under the skin if needed for low glucose emergency, Disp: 0.4 mL, Rfl: 11    insulin glargine (Lantus U-100 Insulin) 100 unit/mL injection, Inject 10 Units under the skin once daily at bedtime., Disp: , Rfl:     Jardiance 25 mg tablet, Take 1 tablet (25 mg) by mouth once daily in the morning. Take before meals., Disp: 90 tablet, Rfl: 0    lancets misc, Check fasting blood sugar once daily., Disp: 100 each, Rfl: 1    levothyroxine (Synthroid, Levoxyl) 75 mcg tablet, Take 1 tablet (75 mcg) by mouth once daily., Disp: 90 tablet, Rfl: 0    lisinopril 2.5 mg tablet, Take 1 tablet (2.5 mg) by mouth once daily., Disp: , Rfl:     metFORMIN  mg 24 hr tablet, TAKE 2 TABLETS BY MOUTH TWICE DAILY, Disp: 120 tablet, Rfl: 0    montelukast (Singulair) 10 mg tablet, Take 1 tablet (10 mg) by mouth once daily., Disp: , Rfl:     oxybutynin XL (Ditropan-XL) 10 mg 24 hr tablet, Take 1 tablet (10 mg) by mouth once daily., Disp: , Rfl:     psyllium (MetamuciL) 0.4 gram capsule, Take 3-6 capsules by mouth once daily. Take with plenty of water., Disp: 180 capsule, Rfl: 11    rosuvastatin (Crestor) 20 mg tablet, Take 1 tablet (20 mg) by mouth once daily., Disp: 90 tablet, Rfl: 0    SITagliptin phosphate (Januvia) 100 mg tablet, Take 1 tablet (100 mg) by mouth once daily., Disp: 30 tablet, Rfl: 11    ciclopirox (Penlac) 8 % solution, Apply to affected toenails daily, once per week remove with polish remover or nail file and repeat for 6 months (Patient not taking: Reported on 6/27/2025), Disp: , Rfl:

## 2025-08-02 LAB
CRP SERPL-MCNC: NORMAL MG/L
ERYTHROCYTE [SEDIMENTATION RATE] IN BLOOD BY WESTERGREN METHOD: 19 MM/H

## 2025-08-04 LAB
CRP SERPL-MCNC: <3 MG/L
ERYTHROCYTE [SEDIMENTATION RATE] IN BLOOD BY WESTERGREN METHOD: 19 MM/H

## 2025-08-10 DIAGNOSIS — E11.3299 NONPROLIFERATIVE DIABETIC RETINOPATHY: ICD-10-CM

## 2025-08-10 DIAGNOSIS — D50.9 IRON DEFICIENCY ANEMIA, UNSPECIFIED IRON DEFICIENCY ANEMIA TYPE: ICD-10-CM

## 2025-08-10 DIAGNOSIS — R79.89 LOW VITAMIN B12 LEVEL: ICD-10-CM

## 2025-08-11 ENCOUNTER — TELEPHONE (OUTPATIENT)
Facility: CLINIC | Age: 70
End: 2025-08-11
Payer: COMMERCIAL

## 2025-08-13 ENCOUNTER — TELEPHONE (OUTPATIENT)
Dept: PRIMARY CARE | Facility: CLINIC | Age: 70
End: 2025-08-13
Payer: COMMERCIAL

## 2025-08-13 DIAGNOSIS — I25.10 CORONARY ARTERIOSCLEROSIS: ICD-10-CM

## 2025-08-13 DIAGNOSIS — J45.20 MILD INTERMITTENT ASTHMA WITHOUT COMPLICATION (HHS-HCC): ICD-10-CM

## 2025-08-13 DIAGNOSIS — N18.31 TYPE 2 DIABETES MELLITUS WITH STAGE 3A CHRONIC KIDNEY DISEASE, WITHOUT LONG-TERM CURRENT USE OF INSULIN (MULTI): Primary | ICD-10-CM

## 2025-08-13 DIAGNOSIS — N18.31 TYPE 2 DIABETES MELLITUS WITH STAGE 3A CHRONIC KIDNEY DISEASE, WITHOUT LONG-TERM CURRENT USE OF INSULIN (MULTI): ICD-10-CM

## 2025-08-13 DIAGNOSIS — D50.9 IRON DEFICIENCY ANEMIA, UNSPECIFIED IRON DEFICIENCY ANEMIA TYPE: Primary | ICD-10-CM

## 2025-08-13 DIAGNOSIS — I10 PRIMARY HYPERTENSION: Primary | ICD-10-CM

## 2025-08-13 DIAGNOSIS — E11.22 TYPE 2 DIABETES MELLITUS WITH STAGE 3A CHRONIC KIDNEY DISEASE, WITHOUT LONG-TERM CURRENT USE OF INSULIN (MULTI): ICD-10-CM

## 2025-08-13 DIAGNOSIS — E11.22 TYPE 2 DIABETES MELLITUS WITH STAGE 3A CHRONIC KIDNEY DISEASE, WITHOUT LONG-TERM CURRENT USE OF INSULIN (MULTI): Primary | ICD-10-CM

## 2025-08-13 RX ORDER — ROSUVASTATIN CALCIUM 20 MG/1
20 TABLET, COATED ORAL
Qty: 30 TABLET | Refills: 0 | Status: SHIPPED | OUTPATIENT
Start: 2025-08-13

## 2025-08-13 RX ORDER — LANCETS 33 GAUGE
EACH MISCELLANEOUS
Qty: 100 EACH | Refills: 0 | Status: SHIPPED | OUTPATIENT
Start: 2025-08-13

## 2025-08-14 ENCOUNTER — TELEPHONE (OUTPATIENT)
Dept: PRIMARY CARE | Facility: CLINIC | Age: 70
End: 2025-08-14
Payer: COMMERCIAL

## 2025-08-18 RX ORDER — FERROUS SULFATE 325(65) MG
325 TABLET, DELAYED RELEASE (ENTERIC COATED) ORAL
Qty: 90 TABLET | Refills: 0 | Status: SHIPPED | OUTPATIENT
Start: 2025-08-18 | End: 2025-11-16

## 2025-08-18 RX ORDER — FUROSEMIDE 20 MG/1
20 TABLET ORAL EVERY OTHER DAY
Qty: 45 TABLET | Refills: 0 | Status: SHIPPED | OUTPATIENT
Start: 2025-08-18 | End: 2025-11-16

## 2025-08-18 RX ORDER — LANCETS
EACH MISCELLANEOUS
Qty: 100 EACH | Refills: 1 | Status: SHIPPED | OUTPATIENT
Start: 2025-08-18

## 2025-08-18 RX ORDER — INSULIN GLARGINE 100 [IU]/ML
10 INJECTION, SOLUTION SUBCUTANEOUS NIGHTLY
Qty: 3 ML | Refills: 1 | Status: SHIPPED | OUTPATIENT
Start: 2025-08-18

## 2025-08-18 RX ORDER — IBUPROFEN 200 MG
CAPSULE ORAL
Qty: 50 EACH | Refills: 1 | Status: SHIPPED | OUTPATIENT
Start: 2025-08-18

## 2025-08-18 RX ORDER — DEXTROSE 4 G
TABLET,CHEWABLE ORAL
Qty: 1 EACH | Refills: 0 | Status: SHIPPED | OUTPATIENT
Start: 2025-08-18

## 2025-08-18 RX ORDER — MONTELUKAST SODIUM 10 MG/1
10 TABLET ORAL
Qty: 30 TABLET | Refills: 0 | Status: SHIPPED | OUTPATIENT
Start: 2025-08-18

## 2025-08-20 DIAGNOSIS — N32.81 OVERACTIVE BLADDER: Primary | ICD-10-CM

## 2025-08-20 RX ORDER — OXYBUTYNIN CHLORIDE 10 MG/1
10 TABLET, EXTENDED RELEASE ORAL
Qty: 30 TABLET | Refills: 0 | Status: SHIPPED | OUTPATIENT
Start: 2025-08-20

## 2025-08-27 ENCOUNTER — APPOINTMENT (OUTPATIENT)
Facility: CLINIC | Age: 70
End: 2025-08-27
Payer: COMMERCIAL

## 2025-09-12 ENCOUNTER — APPOINTMENT (OUTPATIENT)
Dept: PRIMARY CARE | Facility: CLINIC | Age: 70
End: 2025-09-12
Payer: COMMERCIAL

## 2025-09-24 ENCOUNTER — APPOINTMENT (OUTPATIENT)
Facility: CLINIC | Age: 70
End: 2025-09-24
Payer: COMMERCIAL